# Patient Record
Sex: FEMALE | Race: WHITE | NOT HISPANIC OR LATINO
[De-identification: names, ages, dates, MRNs, and addresses within clinical notes are randomized per-mention and may not be internally consistent; named-entity substitution may affect disease eponyms.]

---

## 2018-05-24 ENCOUNTER — RESULT REVIEW (OUTPATIENT)
Age: 48
End: 2018-05-24

## 2018-08-17 ENCOUNTER — OUTPATIENT (OUTPATIENT)
Dept: OUTPATIENT SERVICES | Facility: HOSPITAL | Age: 48
LOS: 1 days | End: 2018-08-17
Payer: COMMERCIAL

## 2018-08-17 ENCOUNTER — APPOINTMENT (OUTPATIENT)
Dept: SURGERY | Facility: CLINIC | Age: 48
End: 2018-08-17

## 2018-08-17 VITALS
RESPIRATION RATE: 16 BRPM | SYSTOLIC BLOOD PRESSURE: 112 MMHG | WEIGHT: 200.62 LBS | HEART RATE: 88 BPM | OXYGEN SATURATION: 98 % | DIASTOLIC BLOOD PRESSURE: 55 MMHG | TEMPERATURE: 99 F | HEIGHT: 62.5 IN

## 2018-08-17 DIAGNOSIS — Z98.890 OTHER SPECIFIED POSTPROCEDURAL STATES: Chronic | ICD-10-CM

## 2018-08-17 DIAGNOSIS — Z01.818 ENCOUNTER FOR OTHER PREPROCEDURAL EXAMINATION: ICD-10-CM

## 2018-08-17 DIAGNOSIS — E66.01 MORBID (SEVERE) OBESITY DUE TO EXCESS CALORIES: ICD-10-CM

## 2018-08-17 DIAGNOSIS — Z41.9 ENCOUNTER FOR PROCEDURE FOR PURPOSES OTHER THAN REMEDYING HEALTH STATE, UNSPECIFIED: Chronic | ICD-10-CM

## 2018-08-17 LAB
ALBUMIN SERPL ELPH-MCNC: 4.8 G/DL — SIGNIFICANT CHANGE UP (ref 3.3–5)
ALP SERPL-CCNC: 72 U/L — SIGNIFICANT CHANGE UP (ref 40–120)
ALT FLD-CCNC: 41 U/L — SIGNIFICANT CHANGE UP (ref 10–45)
ANION GAP SERPL CALC-SCNC: 19 MMOL/L — HIGH (ref 5–17)
APPEARANCE UR: CLEAR — SIGNIFICANT CHANGE UP
APTT BLD: 32.1 SEC — SIGNIFICANT CHANGE UP (ref 27.5–37.4)
AST SERPL-CCNC: 36 U/L — SIGNIFICANT CHANGE UP (ref 10–40)
BACTERIA # UR AUTO: PRESENT /HPF
BILIRUB SERPL-MCNC: 0.4 MG/DL — SIGNIFICANT CHANGE UP (ref 0.2–1.2)
BILIRUB UR-MCNC: NEGATIVE — SIGNIFICANT CHANGE UP
BUN SERPL-MCNC: 18 MG/DL — SIGNIFICANT CHANGE UP (ref 7–23)
CALCIUM SERPL-MCNC: 10.2 MG/DL — SIGNIFICANT CHANGE UP (ref 8.4–10.5)
CHLORIDE SERPL-SCNC: 95 MMOL/L — LOW (ref 96–108)
CO2 SERPL-SCNC: 23 MMOL/L — SIGNIFICANT CHANGE UP (ref 22–31)
COLOR SPEC: YELLOW — SIGNIFICANT CHANGE UP
CREAT SERPL-MCNC: 0.73 MG/DL — SIGNIFICANT CHANGE UP (ref 0.5–1.3)
DIFF PNL FLD: ABNORMAL
EPI CELLS # UR: ABNORMAL /HPF (ref 0–5)
GLUCOSE SERPL-MCNC: 198 MG/DL — HIGH (ref 70–99)
GLUCOSE UR QL: NEGATIVE — SIGNIFICANT CHANGE UP
HCG SERPL-ACNC: <.1 MIU/ML — SIGNIFICANT CHANGE UP
HCT VFR BLD CALC: 43.9 % — SIGNIFICANT CHANGE UP (ref 34.5–45)
HGB BLD-MCNC: 13.8 G/DL — SIGNIFICANT CHANGE UP (ref 11.5–15.5)
INR BLD: 1.04 — SIGNIFICANT CHANGE UP (ref 0.88–1.16)
KETONES UR-MCNC: 15 MG/DL
LEUKOCYTE ESTERASE UR-ACNC: NEGATIVE — SIGNIFICANT CHANGE UP
MCHC RBC-ENTMCNC: 28.3 PG — SIGNIFICANT CHANGE UP (ref 27–34)
MCHC RBC-ENTMCNC: 31.4 G/DL — LOW (ref 32–36)
MCV RBC AUTO: 90.1 FL — SIGNIFICANT CHANGE UP (ref 80–100)
NITRITE UR-MCNC: NEGATIVE — SIGNIFICANT CHANGE UP
PH UR: 6 — SIGNIFICANT CHANGE UP (ref 5–8)
PLATELET # BLD AUTO: 307 K/UL — SIGNIFICANT CHANGE UP (ref 150–400)
POTASSIUM SERPL-MCNC: 4.8 MMOL/L — SIGNIFICANT CHANGE UP (ref 3.5–5.3)
POTASSIUM SERPL-SCNC: 4.8 MMOL/L — SIGNIFICANT CHANGE UP (ref 3.5–5.3)
PROT SERPL-MCNC: 8.6 G/DL — HIGH (ref 6–8.3)
PROT UR-MCNC: NEGATIVE MG/DL — SIGNIFICANT CHANGE UP
PROTHROM AB SERPL-ACNC: 11.5 SEC — SIGNIFICANT CHANGE UP (ref 9.8–12.7)
RBC # BLD: 4.87 M/UL — SIGNIFICANT CHANGE UP (ref 3.8–5.2)
RBC # FLD: 15.3 % — SIGNIFICANT CHANGE UP (ref 10.3–16.9)
RBC CASTS # UR COMP ASSIST: < 5 /HPF — SIGNIFICANT CHANGE UP
SODIUM SERPL-SCNC: 137 MMOL/L — SIGNIFICANT CHANGE UP (ref 135–145)
SP GR SPEC: 1.02 — SIGNIFICANT CHANGE UP (ref 1–1.03)
UROBILINOGEN FLD QL: 0.2 E.U./DL — SIGNIFICANT CHANGE UP
WBC # BLD: 7.7 K/UL — SIGNIFICANT CHANGE UP (ref 3.8–10.5)
WBC # FLD AUTO: 7.7 K/UL — SIGNIFICANT CHANGE UP (ref 3.8–10.5)
WBC UR QL: < 5 /HPF — SIGNIFICANT CHANGE UP

## 2018-08-17 PROCEDURE — 93010 ELECTROCARDIOGRAM REPORT: CPT

## 2018-08-17 PROCEDURE — 71046 X-RAY EXAM CHEST 2 VIEWS: CPT | Mod: 26

## 2018-08-17 NOTE — H&P PST ADULT - PSH
H/O hernia repair  abdominal hernia repair 2012  History of D&C  2 D&Cs before 2008, after miscarriages.  Surgery, elective  C Section x2  2008; 2011

## 2018-08-17 NOTE — PATIENT PROFILE ADULT. - PMH
Acid reflux    Anxiety    Arthritis    Asthma    Depression    Diabetes    Hiatal hernia    Hyperlipidemia    Hypertension    Sleep apnea    Urinary problem  over active bladder

## 2018-08-17 NOTE — H&P PST ADULT - FAMILY HISTORY
Father  Still living? Unknown  Type 2 diabetes mellitus, Age at diagnosis: Age Unknown  Hypertension, Age at diagnosis: Age Unknown     Mother  Still living? No  Type 2 diabetes mellitus, Age at diagnosis: Age Unknown  Family history of polycystic ovaries, Age at diagnosis: Age Unknown  Hypertension, Age at diagnosis: Age Unknown

## 2018-08-17 NOTE — H&P PST ADULT - HISTORY OF PRESENT ILLNESS
Patient decided in late winter of 2017 that she wishes to undergo laparoscopic sleeve gastrectomy after numerous failed attempts at dietary weight loss.

## 2018-08-24 VITALS
SYSTOLIC BLOOD PRESSURE: 152 MMHG | RESPIRATION RATE: 18 BRPM | HEART RATE: 79 BPM | OXYGEN SATURATION: 98 % | HEIGHT: 62.5 IN | DIASTOLIC BLOOD PRESSURE: 68 MMHG | WEIGHT: 191.8 LBS | TEMPERATURE: 98 F

## 2018-08-24 NOTE — PATIENT PROFILE ADULT. - PMH
Acid reflux    Anxiety    Arthritis    Asthma    Depression    Diabetes    Hiatal hernia    Hyperlipidemia    Hypertension    Sleep apnea    Urinary problem  over active bladder Acid reflux    Anxiety    Arthritis    Asthma    Depression    Diabetes    Hiatal hernia    Hyperlipidemia    Hypertension    Polycystic ovarian syndrome    Sleep apnea    Urinary problem  over active bladder

## 2018-08-24 NOTE — PRE-OP CHECKLIST - SELECT TESTS ORDERED
CBC/CMP/PT/PTT/INR/Urinalysis/HCG/EKG/CXR ICON-/CBC/CMP/PT/PTT/INR/Urinalysis/HCG/EKG/CXR ICON-negative/CBC/CMP/PT/PTT/INR/Urinalysis/HCG/EKG/CXR

## 2018-08-24 NOTE — PATIENT PROFILE ADULT. - PSH
H/O hernia repair  abdominal hernia repair 2012  Surgery, elective  C Section x2  2008; 2011 Elective surgery  D & C x2  H/O hernia repair  abdominal hernia repair 2012  History of D&C  2 D&Cs before 2008, after miscarriages.  Surgery, elective  C Section x2  2008; 2011

## 2018-08-27 ENCOUNTER — RESULT REVIEW (OUTPATIENT)
Age: 48
End: 2018-08-27

## 2018-08-27 ENCOUNTER — INPATIENT (INPATIENT)
Facility: HOSPITAL | Age: 48
LOS: 1 days | Discharge: HOME | DRG: 621 | End: 2018-08-29
Attending: SURGERY | Admitting: SURGERY
Payer: COMMERCIAL

## 2018-08-27 DIAGNOSIS — Z98.890 OTHER SPECIFIED POSTPROCEDURAL STATES: Chronic | ICD-10-CM

## 2018-08-27 DIAGNOSIS — Z41.9 ENCOUNTER FOR PROCEDURE FOR PURPOSES OTHER THAN REMEDYING HEALTH STATE, UNSPECIFIED: Chronic | ICD-10-CM

## 2018-08-27 LAB
BLD GP AB SCN SERPL QL: NEGATIVE — SIGNIFICANT CHANGE UP
GLUCOSE BLDC GLUCOMTR-MCNC: 126 MG/DL — HIGH (ref 70–99)
GLUCOSE BLDC GLUCOMTR-MCNC: 142 MG/DL — HIGH (ref 70–99)
GLUCOSE BLDC GLUCOMTR-MCNC: 172 MG/DL — HIGH (ref 70–99)
RH IG SCN BLD-IMP: POSITIVE — SIGNIFICANT CHANGE UP

## 2018-08-27 RX ORDER — BUPROPION HYDROCHLORIDE 150 MG/1
150 TABLET, EXTENDED RELEASE ORAL DAILY
Refills: 0 | Status: DISCONTINUED | OUTPATIENT
Start: 2018-08-27 | End: 2018-08-29

## 2018-08-27 RX ORDER — SODIUM CHLORIDE 9 MG/ML
1000 INJECTION, SOLUTION INTRAVENOUS
Refills: 0 | Status: DISCONTINUED | OUTPATIENT
Start: 2018-08-27 | End: 2018-08-29

## 2018-08-27 RX ORDER — MONTELUKAST 4 MG/1
10 TABLET, CHEWABLE ORAL DAILY
Refills: 0 | Status: DISCONTINUED | OUTPATIENT
Start: 2018-08-27 | End: 2018-08-29

## 2018-08-27 RX ORDER — DEXTROSE 50 % IN WATER 50 %
12.5 SYRINGE (ML) INTRAVENOUS ONCE
Refills: 0 | Status: DISCONTINUED | OUTPATIENT
Start: 2018-08-27 | End: 2018-08-29

## 2018-08-27 RX ORDER — INSULIN LISPRO 100/ML
VIAL (ML) SUBCUTANEOUS
Refills: 0 | Status: DISCONTINUED | OUTPATIENT
Start: 2018-08-27 | End: 2018-08-29

## 2018-08-27 RX ORDER — GLUCAGON INJECTION, SOLUTION 0.5 MG/.1ML
1 INJECTION, SOLUTION SUBCUTANEOUS ONCE
Refills: 0 | Status: DISCONTINUED | OUTPATIENT
Start: 2018-08-27 | End: 2018-08-29

## 2018-08-27 RX ORDER — IPRATROPIUM/ALBUTEROL SULFATE 18-103MCG
3 AEROSOL WITH ADAPTER (GRAM) INHALATION EVERY 6 HOURS
Refills: 0 | Status: DISCONTINUED | OUTPATIENT
Start: 2018-08-27 | End: 2018-08-29

## 2018-08-27 RX ORDER — HYDROMORPHONE HYDROCHLORIDE 2 MG/ML
0.5 INJECTION INTRAMUSCULAR; INTRAVENOUS; SUBCUTANEOUS EVERY 4 HOURS
Refills: 0 | Status: DISCONTINUED | OUTPATIENT
Start: 2018-08-27 | End: 2018-08-28

## 2018-08-27 RX ORDER — DEXTROSE 50 % IN WATER 50 %
25 SYRINGE (ML) INTRAVENOUS ONCE
Refills: 0 | Status: DISCONTINUED | OUTPATIENT
Start: 2018-08-27 | End: 2018-08-29

## 2018-08-27 RX ORDER — ACETAMINOPHEN 500 MG
1000 TABLET ORAL ONCE
Refills: 0 | Status: COMPLETED | OUTPATIENT
Start: 2018-08-27 | End: 2018-08-27

## 2018-08-27 RX ORDER — ENOXAPARIN SODIUM 100 MG/ML
40 INJECTION SUBCUTANEOUS ONCE
Refills: 0 | Status: COMPLETED | OUTPATIENT
Start: 2018-08-27 | End: 2018-08-27

## 2018-08-27 RX ORDER — PANTOPRAZOLE SODIUM 20 MG/1
40 TABLET, DELAYED RELEASE ORAL DAILY
Refills: 0 | Status: DISCONTINUED | OUTPATIENT
Start: 2018-08-27 | End: 2018-08-29

## 2018-08-27 RX ORDER — DEXTROSE 50 % IN WATER 50 %
15 SYRINGE (ML) INTRAVENOUS ONCE
Refills: 0 | Status: DISCONTINUED | OUTPATIENT
Start: 2018-08-27 | End: 2018-08-29

## 2018-08-27 RX ORDER — ESCITALOPRAM OXALATE 10 MG/1
20 TABLET, FILM COATED ORAL DAILY
Refills: 0 | Status: DISCONTINUED | OUTPATIENT
Start: 2018-08-27 | End: 2018-08-29

## 2018-08-27 RX ORDER — SCOPALAMINE 1 MG/3D
1 PATCH, EXTENDED RELEASE TRANSDERMAL ONCE
Refills: 0 | Status: COMPLETED | OUTPATIENT
Start: 2018-08-27 | End: 2018-08-27

## 2018-08-27 RX ORDER — HYDROMORPHONE HYDROCHLORIDE 2 MG/ML
1 INJECTION INTRAMUSCULAR; INTRAVENOUS; SUBCUTANEOUS EVERY 4 HOURS
Refills: 0 | Status: DISCONTINUED | OUTPATIENT
Start: 2018-08-27 | End: 2018-08-28

## 2018-08-27 RX ORDER — BUPIVACAINE 13.3 MG/ML
20 INJECTION, SUSPENSION, LIPOSOMAL INFILTRATION ONCE
Refills: 0 | Status: DISCONTINUED | OUTPATIENT
Start: 2018-08-27 | End: 2018-08-29

## 2018-08-27 RX ORDER — HEPARIN SODIUM 5000 [USP'U]/ML
5000 INJECTION INTRAVENOUS; SUBCUTANEOUS EVERY 8 HOURS
Refills: 0 | Status: DISCONTINUED | OUTPATIENT
Start: 2018-08-28 | End: 2018-08-29

## 2018-08-27 RX ORDER — ONDANSETRON 8 MG/1
4 TABLET, FILM COATED ORAL EVERY 6 HOURS
Refills: 0 | Status: DISCONTINUED | OUTPATIENT
Start: 2018-08-27 | End: 2018-08-29

## 2018-08-27 RX ADMIN — SODIUM CHLORIDE 120 MILLILITER(S): 9 INJECTION, SOLUTION INTRAVENOUS at 23:44

## 2018-08-27 RX ADMIN — HYDROMORPHONE HYDROCHLORIDE 0.5 MILLIGRAM(S): 2 INJECTION INTRAMUSCULAR; INTRAVENOUS; SUBCUTANEOUS at 20:50

## 2018-08-27 RX ADMIN — PANTOPRAZOLE SODIUM 40 MILLIGRAM(S): 20 TABLET, DELAYED RELEASE ORAL at 17:53

## 2018-08-27 RX ADMIN — HYDROMORPHONE HYDROCHLORIDE 0.5 MILLIGRAM(S): 2 INJECTION INTRAMUSCULAR; INTRAVENOUS; SUBCUTANEOUS at 20:55

## 2018-08-27 RX ADMIN — HEPARIN SODIUM 5000 UNIT(S): 5000 INJECTION INTRAVENOUS; SUBCUTANEOUS at 23:41

## 2018-08-27 RX ADMIN — Medication 1000 MILLIGRAM(S): at 23:06

## 2018-08-27 RX ADMIN — ONDANSETRON 4 MILLIGRAM(S): 8 TABLET, FILM COATED ORAL at 17:59

## 2018-08-27 RX ADMIN — Medication 50 MILLIGRAM(S): at 23:42

## 2018-08-27 RX ADMIN — Medication 3 MILLILITER(S): at 21:58

## 2018-08-27 RX ADMIN — SODIUM CHLORIDE 150 MILLILITER(S): 9 INJECTION, SOLUTION INTRAVENOUS at 18:31

## 2018-08-27 RX ADMIN — HYDROMORPHONE HYDROCHLORIDE 1 MILLIGRAM(S): 2 INJECTION INTRAMUSCULAR; INTRAVENOUS; SUBCUTANEOUS at 18:00

## 2018-08-27 RX ADMIN — ENOXAPARIN SODIUM 40 MILLIGRAM(S): 100 INJECTION SUBCUTANEOUS at 13:02

## 2018-08-27 RX ADMIN — HYDROMORPHONE HYDROCHLORIDE 1 MILLIGRAM(S): 2 INJECTION INTRAMUSCULAR; INTRAVENOUS; SUBCUTANEOUS at 18:31

## 2018-08-27 RX ADMIN — Medication: at 17:55

## 2018-08-27 RX ADMIN — Medication 400 MILLIGRAM(S): at 22:40

## 2018-08-27 RX ADMIN — SCOPALAMINE 1 PATCH: 1 PATCH, EXTENDED RELEASE TRANSDERMAL at 13:02

## 2018-08-27 RX ADMIN — ONDANSETRON 4 MILLIGRAM(S): 8 TABLET, FILM COATED ORAL at 23:44

## 2018-08-27 NOTE — H&P ADULT - PSH
Elective surgery  D & C x2  H/O hernia repair  abdominal hernia repair 2012  History of D&C  2 D&Cs before 2008, after miscarriages.  Surgery, elective  C Section x2  2008; 2011

## 2018-08-27 NOTE — BRIEF OPERATIVE NOTE - PROCEDURE
<<-----Click on this checkbox to enter Procedure Gastrectomy, laparoscopic sleeve  08/27/2018    Active  SSABRUDIN

## 2018-08-27 NOTE — H&P ADULT - NSHPPHYSICALEXAM_GEN_ALL_CORE
General : comfortable, not in acute distress  RS : Normal breath sounds  Abdomen : soft, ND, NT  Extremities : no edema, WWP

## 2018-08-27 NOTE — H&P ADULT - PMH
Acid reflux    Anxiety    Arthritis    Asthma    Depression    Diabetes    Hiatal hernia    Hyperlipidemia    Hypertension    Polycystic ovarian syndrome    Sleep apnea    Urinary problem  over active bladder

## 2018-08-27 NOTE — PROGRESS NOTE ADULT - SUBJECTIVE AND OBJECTIVE BOX
POST-OPERATIVE NOTE    Procedure: Laparoscopic sleeve gastrectomy    Diagnosis/Indication: morbid obesity    Surgeon: Dr. Agulia    S: Pt has no complaints. Denies CP, SOB, ORTIZ, calf tenderness. Pain controlled with medication. Denies nausea, vomiting.    O:  T(C): 36.1 (08-27-18 @ 20:28), Max: 36.7 (08-27-18 @ 17:21)  T(F): 97 (08-27-18 @ 20:28), Max: 98 (08-27-18 @ 17:21)  HR: 92 (08-27-18 @ 20:00) (84 - 106)  BP: 171/79 (08-27-18 @ 20:00) (152/72 - 171/79)  RR: 18 (08-27-18 @ 20:00) (10 - 18)  SpO2: 98% (08-27-18 @ 20:00) (93% - 98%)  Wt(kg): --    Gen: NAD, resting comfortably in bed  C/V: NSR  Pulm: Nonlabored breathing, no respiratory distress  Abd: soft, NT/ND, incision areas are clean, dry and intact  Extrem: WWP, no calf edema or tenderness, SCDs in place    A/P: 47y Female s/p above procedure  Diet: BCLD  IVF: LR @ 150  Pain/nausea control  SQH/SCDs/OOBA/IS  Dispo pending pain control, PO tolerance, clinical improvement POST-OPERATIVE NOTE    Procedure: Laparoscopic sleeve gastrectomy    Diagnosis/Indication: morbid obesity    Surgeon: Dr. Aguila    S: Pt has no complaints. Denies CP, SOB, ORTIZ, calf tenderness. Pain controlled with medication. Denies nausea, vomiting.    O:  T(C): 36.1 (08-27-18 @ 20:28), Max: 36.7 (08-27-18 @ 17:21)  T(F): 97 (08-27-18 @ 20:28), Max: 98 (08-27-18 @ 17:21)  HR: 92 (08-27-18 @ 20:00) (84 - 106)  BP: 171/79 (08-27-18 @ 20:00) (152/72 - 171/79)  RR: 18 (08-27-18 @ 20:00) (10 - 18)  SpO2: 98% (08-27-18 @ 20:00) (93% - 98%)  Wt(kg): --    Gen: NAD, resting comfortably in bed  C/V: NSR  Pulm: Nonlabored breathing, no respiratory distress  Abd: soft, NT/ND, incision areas are clean, dry and intact  Extrem: WWP, no calf edema or tenderness, SCDs in place    A/P: 47y Female s/p above procedure  Diet: BCLD  IVF: LR @ 150  Pain/nausea control  SQH/SCDs/OOBA/IS  Dispo pending pain control, PO tolerance, clinical improvement

## 2018-08-27 NOTE — BRIEF OPERATIVE NOTE - OPERATION/FINDINGS
Pneumoperitoneum created via Downing technique. Hiatal hernia identified and repaired. Sleeve gastrectomy was done by using 38F bougie. Hemostasis achieved. No active bleeding seen at the end of the procedure.

## 2018-08-27 NOTE — H&P ADULT - ASSESSMENT
47yo F with multiple co-morbidities and morbid obesity, for elective laparoscopic sleeve gastrectomy.    Plan :  Admit under   Scopolamine patch  IV Lovenox  NPO/IVF  Type and cross   For lap sleeve today 49yo F with multiple co-morbidities and morbid obesity, for elective laparoscopic sleeve gastrectomy.    Plan :  Admit under   Scopolamine patch  IV Lovenox  NPO/IVF  Type and cross   For lap sleeve today

## 2018-08-27 NOTE — PROGRESS NOTE ADULT - SUBJECTIVE AND OBJECTIVE BOX
POST-OPERATIVE NOTE    Procedure: Laparoscopic sleeve gastrectomy with hiatal hernia repair    Diagnosis/Indication: morbid obesity    Surgeon: Dr. Aguila    S: Pt has no complaints. Denies CP, SOB, ORTIZ, calf tenderness. Pain controlled with medication. Denies nausea, vomiting.    O:  T(C): 36.1 (08-27-18 @ 20:28), Max: 36.1 (08-27-18 @ 20:28)  T(F): 97 (08-27-18 @ 20:28), Max: 97 (08-27-18 @ 20:28)  HR: 78 (08-27-18 @ 21:16) (78 - 106)  BP: 168/76 (08-27-18 @ 21:16) (158/75 - 171/79)  RR: 16 (08-27-18 @ 21:16) (10 - 18)  SpO2: 95% (08-27-18 @ 21:16) (95% - 98%)  Wt(kg): --    Gen: NAD, resting comfortably in bed  C/V: NSR  Pulm: Nonlabored breathing, no respiratory distress  Abd: soft, NT/ND, incision areas are clean, dry and intact  Extrem: WWP, no calf edema or tenderness, SCDs in place    A/P: 47y Female s/p above procedure  Diet: BCLD  IVF: LR @150  Pain/nausea control  SQH/SCDs/OOBA/IS  Dispo pending pain control, PO tolerance, clinical improvement

## 2018-08-27 NOTE — H&P ADULT - HISTORY OF PRESENT ILLNESS
48yo F with PCOS, REGINO, GERD. HTN. DM. Asthma, HLD, Anxiety, depression, arthritis, hiatal hernia, h/o hernia repair, morbid obesity (BMI 34.5), for elective laparoscopic sleeve gastrectomy. 46yo F with PCOS, REGINO, GERD. HTN. DM. Asthma, HLD, Anxiety, depression, arthritis, hiatal hernia, h/o hernia repair, morbid obesity (BMI 34.5), for elective laparoscopic sleeve gastrectomy.

## 2018-08-28 ENCOUNTER — TRANSCRIPTION ENCOUNTER (OUTPATIENT)
Age: 48
End: 2018-08-28

## 2018-08-28 DIAGNOSIS — G47.33 OBSTRUCTIVE SLEEP APNEA (ADULT) (PEDIATRIC): ICD-10-CM

## 2018-08-28 DIAGNOSIS — F41.1 GENERALIZED ANXIETY DISORDER: ICD-10-CM

## 2018-08-28 DIAGNOSIS — E11.9 TYPE 2 DIABETES MELLITUS WITHOUT COMPLICATIONS: ICD-10-CM

## 2018-08-28 DIAGNOSIS — E28.2 POLYCYSTIC OVARIAN SYNDROME: ICD-10-CM

## 2018-08-28 DIAGNOSIS — I10 ESSENTIAL (PRIMARY) HYPERTENSION: ICD-10-CM

## 2018-08-28 DIAGNOSIS — N32.81 OVERACTIVE BLADDER: ICD-10-CM

## 2018-08-28 DIAGNOSIS — E66.01 MORBID (SEVERE) OBESITY DUE TO EXCESS CALORIES: ICD-10-CM

## 2018-08-28 LAB
ANION GAP SERPL CALC-SCNC: 18 MMOL/L — HIGH (ref 5–17)
BUN SERPL-MCNC: 8 MG/DL — SIGNIFICANT CHANGE UP (ref 7–23)
CALCIUM SERPL-MCNC: 9.5 MG/DL — SIGNIFICANT CHANGE UP (ref 8.4–10.5)
CHLORIDE SERPL-SCNC: 99 MMOL/L — SIGNIFICANT CHANGE UP (ref 96–108)
CO2 SERPL-SCNC: 21 MMOL/L — LOW (ref 22–31)
CREAT SERPL-MCNC: 0.65 MG/DL — SIGNIFICANT CHANGE UP (ref 0.5–1.3)
GLUCOSE BLDC GLUCOMTR-MCNC: 117 MG/DL — HIGH (ref 70–99)
GLUCOSE BLDC GLUCOMTR-MCNC: 137 MG/DL — HIGH (ref 70–99)
GLUCOSE BLDC GLUCOMTR-MCNC: 152 MG/DL — HIGH (ref 70–99)
GLUCOSE BLDC GLUCOMTR-MCNC: 163 MG/DL — HIGH (ref 70–99)
GLUCOSE SERPL-MCNC: 162 MG/DL — HIGH (ref 70–99)
HBA1C BLD-MCNC: 7.8 % — HIGH (ref 4–5.6)
HCT VFR BLD CALC: 37.2 % — SIGNIFICANT CHANGE UP (ref 34.5–45)
HCT VFR BLD CALC: 38.1 % — SIGNIFICANT CHANGE UP (ref 34.5–45)
HGB BLD-MCNC: 11.9 G/DL — SIGNIFICANT CHANGE UP (ref 11.5–15.5)
HGB BLD-MCNC: 12.3 G/DL — SIGNIFICANT CHANGE UP (ref 11.5–15.5)
MAGNESIUM SERPL-MCNC: 1.7 MG/DL — SIGNIFICANT CHANGE UP (ref 1.6–2.6)
MCHC RBC-ENTMCNC: 28.6 PG — SIGNIFICANT CHANGE UP (ref 27–34)
MCHC RBC-ENTMCNC: 28.7 PG — SIGNIFICANT CHANGE UP (ref 27–34)
MCHC RBC-ENTMCNC: 32 G/DL — SIGNIFICANT CHANGE UP (ref 32–36)
MCHC RBC-ENTMCNC: 32.3 G/DL — SIGNIFICANT CHANGE UP (ref 32–36)
MCV RBC AUTO: 88.8 FL — SIGNIFICANT CHANGE UP (ref 80–100)
MCV RBC AUTO: 89.4 FL — SIGNIFICANT CHANGE UP (ref 80–100)
PHOSPHATE SERPL-MCNC: 4.8 MG/DL — HIGH (ref 2.5–4.5)
PLATELET # BLD AUTO: 264 K/UL — SIGNIFICANT CHANGE UP (ref 150–400)
PLATELET # BLD AUTO: 281 K/UL — SIGNIFICANT CHANGE UP (ref 150–400)
POTASSIUM SERPL-MCNC: 4.5 MMOL/L — SIGNIFICANT CHANGE UP (ref 3.5–5.3)
POTASSIUM SERPL-SCNC: 4.5 MMOL/L — SIGNIFICANT CHANGE UP (ref 3.5–5.3)
RBC # BLD: 4.16 M/UL — SIGNIFICANT CHANGE UP (ref 3.8–5.2)
RBC # BLD: 4.29 M/UL — SIGNIFICANT CHANGE UP (ref 3.8–5.2)
RBC # FLD: 15.3 % — SIGNIFICANT CHANGE UP (ref 10.3–16.9)
SODIUM SERPL-SCNC: 138 MMOL/L — SIGNIFICANT CHANGE UP (ref 135–145)
WBC # BLD: 10.6 K/UL — HIGH (ref 3.8–10.5)
WBC # BLD: 7.8 K/UL — SIGNIFICANT CHANGE UP (ref 3.8–10.5)
WBC # FLD AUTO: 10.6 K/UL — HIGH (ref 3.8–10.5)
WBC # FLD AUTO: 7.8 K/UL — SIGNIFICANT CHANGE UP (ref 3.8–10.5)

## 2018-08-28 RX ORDER — ACETAMINOPHEN 500 MG
1000 TABLET ORAL ONCE
Refills: 0 | Status: COMPLETED | OUTPATIENT
Start: 2018-08-28 | End: 2018-08-28

## 2018-08-28 RX ORDER — HYOSCYAMINE SULFATE 0.13 MG
0.12 TABLET ORAL EVERY 4 HOURS
Refills: 0 | Status: DISCONTINUED | OUTPATIENT
Start: 2018-08-28 | End: 2018-08-29

## 2018-08-28 RX ORDER — HYDROMORPHONE HYDROCHLORIDE 2 MG/ML
0.5 INJECTION INTRAMUSCULAR; INTRAVENOUS; SUBCUTANEOUS ONCE
Refills: 0 | Status: DISCONTINUED | OUTPATIENT
Start: 2018-08-28 | End: 2018-08-28

## 2018-08-28 RX ORDER — HYDRALAZINE HCL 50 MG
5 TABLET ORAL ONCE
Refills: 0 | Status: COMPLETED | OUTPATIENT
Start: 2018-08-28 | End: 2018-08-28

## 2018-08-28 RX ORDER — MAGNESIUM SULFATE 500 MG/ML
2 VIAL (ML) INJECTION ONCE
Refills: 0 | Status: COMPLETED | OUTPATIENT
Start: 2018-08-28 | End: 2018-08-28

## 2018-08-28 RX ORDER — OXYCODONE AND ACETAMINOPHEN 5; 325 MG/1; MG/1
2 TABLET ORAL EVERY 4 HOURS
Refills: 0 | Status: DISCONTINUED | OUTPATIENT
Start: 2018-08-28 | End: 2018-08-29

## 2018-08-28 RX ORDER — SIMETHICONE 80 MG/1
80 TABLET, CHEWABLE ORAL EVERY 6 HOURS
Refills: 0 | Status: DISCONTINUED | OUTPATIENT
Start: 2018-08-28 | End: 2018-08-29

## 2018-08-28 RX ORDER — SIMETHICONE 80 MG/1
80 TABLET, CHEWABLE ORAL ONCE
Refills: 0 | Status: COMPLETED | OUTPATIENT
Start: 2018-08-28 | End: 2018-08-28

## 2018-08-28 RX ORDER — OXYCODONE AND ACETAMINOPHEN 5; 325 MG/1; MG/1
1 TABLET ORAL EVERY 4 HOURS
Refills: 0 | Status: DISCONTINUED | OUTPATIENT
Start: 2018-08-28 | End: 2018-08-29

## 2018-08-28 RX ADMIN — PANTOPRAZOLE SODIUM 40 MILLIGRAM(S): 20 TABLET, DELAYED RELEASE ORAL at 11:00

## 2018-08-28 RX ADMIN — HYDROMORPHONE HYDROCHLORIDE 0.5 MILLIGRAM(S): 2 INJECTION INTRAMUSCULAR; INTRAVENOUS; SUBCUTANEOUS at 16:21

## 2018-08-28 RX ADMIN — Medication 0.12 MILLIGRAM(S): at 22:05

## 2018-08-28 RX ADMIN — Medication 50 MILLIGRAM(S): at 14:35

## 2018-08-28 RX ADMIN — HYDROMORPHONE HYDROCHLORIDE 0.5 MILLIGRAM(S): 2 INJECTION INTRAMUSCULAR; INTRAVENOUS; SUBCUTANEOUS at 05:43

## 2018-08-28 RX ADMIN — HEPARIN SODIUM 5000 UNIT(S): 5000 INJECTION INTRAVENOUS; SUBCUTANEOUS at 14:35

## 2018-08-28 RX ADMIN — MONTELUKAST 10 MILLIGRAM(S): 4 TABLET, CHEWABLE ORAL at 11:00

## 2018-08-28 RX ADMIN — Medication 2: at 06:34

## 2018-08-28 RX ADMIN — ESCITALOPRAM OXALATE 20 MILLIGRAM(S): 10 TABLET, FILM COATED ORAL at 11:00

## 2018-08-28 RX ADMIN — HYDROMORPHONE HYDROCHLORIDE 1 MILLIGRAM(S): 2 INJECTION INTRAMUSCULAR; INTRAVENOUS; SUBCUTANEOUS at 07:33

## 2018-08-28 RX ADMIN — SODIUM CHLORIDE 75 MILLILITER(S): 9 INJECTION, SOLUTION INTRAVENOUS at 11:01

## 2018-08-28 RX ADMIN — Medication 3 MILLILITER(S): at 10:50

## 2018-08-28 RX ADMIN — HYDROMORPHONE HYDROCHLORIDE 1 MILLIGRAM(S): 2 INJECTION INTRAMUSCULAR; INTRAVENOUS; SUBCUTANEOUS at 02:44

## 2018-08-28 RX ADMIN — SIMETHICONE 80 MILLIGRAM(S): 80 TABLET, CHEWABLE ORAL at 18:34

## 2018-08-28 RX ADMIN — SIMETHICONE 80 MILLIGRAM(S): 80 TABLET, CHEWABLE ORAL at 07:47

## 2018-08-28 RX ADMIN — HYDROMORPHONE HYDROCHLORIDE 0.5 MILLIGRAM(S): 2 INJECTION INTRAMUSCULAR; INTRAVENOUS; SUBCUTANEOUS at 01:52

## 2018-08-28 RX ADMIN — OXYCODONE AND ACETAMINOPHEN 2 TABLET(S): 5; 325 TABLET ORAL at 20:31

## 2018-08-28 RX ADMIN — HYDROMORPHONE HYDROCHLORIDE 1 MILLIGRAM(S): 2 INJECTION INTRAMUSCULAR; INTRAVENOUS; SUBCUTANEOUS at 07:36

## 2018-08-28 RX ADMIN — Medication 3 MILLILITER(S): at 05:41

## 2018-08-28 RX ADMIN — OXYCODONE AND ACETAMINOPHEN 2 TABLET(S): 5; 325 TABLET ORAL at 12:11

## 2018-08-28 RX ADMIN — HEPARIN SODIUM 5000 UNIT(S): 5000 INJECTION INTRAVENOUS; SUBCUTANEOUS at 06:35

## 2018-08-28 RX ADMIN — HYDROMORPHONE HYDROCHLORIDE 1 MILLIGRAM(S): 2 INJECTION INTRAMUSCULAR; INTRAVENOUS; SUBCUTANEOUS at 02:47

## 2018-08-28 RX ADMIN — HYDROMORPHONE HYDROCHLORIDE 0.5 MILLIGRAM(S): 2 INJECTION INTRAMUSCULAR; INTRAVENOUS; SUBCUTANEOUS at 18:33

## 2018-08-28 RX ADMIN — HYDROMORPHONE HYDROCHLORIDE 0.5 MILLIGRAM(S): 2 INJECTION INTRAMUSCULAR; INTRAVENOUS; SUBCUTANEOUS at 17:00

## 2018-08-28 RX ADMIN — Medication 400 MILLIGRAM(S): at 07:47

## 2018-08-28 RX ADMIN — Medication 0.12 MILLIGRAM(S): at 17:47

## 2018-08-28 RX ADMIN — ONDANSETRON 4 MILLIGRAM(S): 8 TABLET, FILM COATED ORAL at 23:59

## 2018-08-28 RX ADMIN — HEPARIN SODIUM 5000 UNIT(S): 5000 INJECTION INTRAVENOUS; SUBCUTANEOUS at 22:05

## 2018-08-28 RX ADMIN — BUPROPION HYDROCHLORIDE 150 MILLIGRAM(S): 150 TABLET, EXTENDED RELEASE ORAL at 11:00

## 2018-08-28 RX ADMIN — SIMETHICONE 80 MILLIGRAM(S): 80 TABLET, CHEWABLE ORAL at 01:56

## 2018-08-28 RX ADMIN — ONDANSETRON 4 MILLIGRAM(S): 8 TABLET, FILM COATED ORAL at 17:29

## 2018-08-28 RX ADMIN — Medication 50 MILLIGRAM(S): at 06:34

## 2018-08-28 RX ADMIN — Medication 5 MILLIGRAM(S): at 17:29

## 2018-08-28 RX ADMIN — Medication 1000 MILLIGRAM(S): at 07:48

## 2018-08-28 RX ADMIN — OXYCODONE AND ACETAMINOPHEN 2 TABLET(S): 5; 325 TABLET ORAL at 11:11

## 2018-08-28 RX ADMIN — ONDANSETRON 4 MILLIGRAM(S): 8 TABLET, FILM COATED ORAL at 05:43

## 2018-08-28 RX ADMIN — HYDROMORPHONE HYDROCHLORIDE 0.5 MILLIGRAM(S): 2 INJECTION INTRAMUSCULAR; INTRAVENOUS; SUBCUTANEOUS at 19:00

## 2018-08-28 RX ADMIN — HYDROMORPHONE HYDROCHLORIDE 0.5 MILLIGRAM(S): 2 INJECTION INTRAMUSCULAR; INTRAVENOUS; SUBCUTANEOUS at 01:46

## 2018-08-28 RX ADMIN — Medication 50 GRAM(S): at 10:51

## 2018-08-28 RX ADMIN — HYDROMORPHONE HYDROCHLORIDE 0.5 MILLIGRAM(S): 2 INJECTION INTRAMUSCULAR; INTRAVENOUS; SUBCUTANEOUS at 05:50

## 2018-08-28 RX ADMIN — Medication 2: at 23:41

## 2018-08-28 RX ADMIN — ONDANSETRON 4 MILLIGRAM(S): 8 TABLET, FILM COATED ORAL at 11:00

## 2018-08-28 RX ADMIN — OXYCODONE AND ACETAMINOPHEN 2 TABLET(S): 5; 325 TABLET ORAL at 21:02

## 2018-08-28 RX ADMIN — Medication 3 MILLILITER(S): at 17:21

## 2018-08-28 NOTE — DISCHARGE NOTE ADULT - PATIENT PORTAL LINK FT
You can access the [facility] Patient Portal, offered by [facility], by registering with the following website: [email or web address] You can access the [facility] Patient Portal, offered by [facility], by registering with the following website: [email or web address] You can access the [facility] Patient Portal, offered by [facility], by registering with the following website: [email or web address]

## 2018-08-28 NOTE — DISCHARGE NOTE ADULT - HOSPITAL COURSE
46yo F with PCOS, REGINO, GERD. HTN. DM. Asthma, HLD, Anxiety, depression, arthritis, hiatal hernia, h/o hernia repair, morbid obesity (BMI 34.5), for elective laparoscopic sleeve gastrectomy. Procedure was uncomplicated and the patient tolerated well. His postoperative course was unremarkable with advancement of diet, passing trial of void, and pain control. On day of discharge patient was tolerating diet, voiding, ambulating, pain well controlled and stable to be discharge. 48yo F with PCOS, REGINO, GERD. HTN. DM. Asthma, HLD, Anxiety, depression, arthritis, hiatal hernia, h/o hernia repair, morbid obesity (BMI 34.5), for elective laparoscopic sleeve gastrectomy. Procedure was uncomplicated and the patient tolerated well. His postoperative course was unremarkable with advancement of diet, passing trial of void, and pain control. On day of discharge patient was tolerating diet, voiding, ambulating, pain well controlled and stable to be discharge.

## 2018-08-28 NOTE — DISCHARGE NOTE ADULT - NS MD DC FALL RISK RISK
For information on Fall & Injury Prevention, visit [email or web address] For information on Fall & Injury Prevention, visit [email or web address] For information on Fall & Injury Prevention, visit [email or web address]

## 2018-08-28 NOTE — PROGRESS NOTE ADULT - ASSESSMENT
48yo F with PCOS, REGINO, GERD. HTN. DM. Asthma, HLD, Anxiety, depression, arthritis, hiatal hernia, h/o hernia repair, morbid obesity (BMI 34.5), for elective laparoscopic sleeve gastrectomy.     Pain control/nausea control  BCLD/IVF  ISS for diabetes  CPAP  Duoneb for asthma and Montelukast  SCDs and restart HSQ depending on PM CBC

## 2018-08-28 NOTE — DISCHARGE NOTE ADULT - CARE PROVIDER_API CALL
Maria D Aguila), Surgery; Surgical Critical Care  [address] [facility] [address], [place] [address]  Phone: [phone]  Fax: [phone] Maria D Aguila), Surgery; Surgical Critical Care  [address] [facility] [address] [address]  Phone: [phone]  Fax: [phone] Maria D Aguila), Surgery; Surgical Critical Care  [address] [facility] [address] [address]  Phone: [phone]  Fax: [phone]

## 2018-08-28 NOTE — DIETITIAN INITIAL EVALUATION ADULT. - OTHER INFO
48yo F with PCOS, REGINO, GERD. HTN. DM. Asthma, HLD, Anxiety, depression, arthritis, hiatal hernia, h/o hernia repair, morbid obesity (BMI 34.5), s/p elective laparoscopic sleeve gastrectomy. Pt seen resting in bed. She reports tolerance to liquids, but reports only having sips of water so far. Denies N/V/D/C and mechanical issues. Pain being controlled. Pt c/o gas. PTA pt reports following with OP RD for wt loss and pre-op counseling. Provided extensive nutrition education s/p bariatric surgery regarding diet progression, hydration and vitamin supplementation. Pt endorses having premier shakes and appropriate vitamins at home. Continue to advance diet phases per medical team as appropriate/tolerated. 46yo F with PCOS, REGINO, GERD. HTN. DM. Asthma, HLD, Anxiety, depression, arthritis, hiatal hernia, h/o hernia repair, morbid obesity (BMI 34.5), s/p elective laparoscopic sleeve gastrectomy. Pt seen resting in bed. She reports tolerance to liquids, but reports only having sips of water so far. Denies N/V/D/C and mechanical issues. Pain being controlled. Pt c/o gas. PTA pt reports following with OP RD for wt loss and pre-op counseling. Provided extensive nutrition education s/p bariatric surgery regarding diet progression, hydration and vitamin supplementation. Pt endorses having premier shakes and appropriate vitamins at home. Continue to advance diet phases per medical team as appropriate/tolerated.

## 2018-08-28 NOTE — DISCHARGE NOTE ADULT - MEDICATION SUMMARY - MEDICATIONS TO TAKE
I will START or STAY ON the medications listed below when I get home from the hospital:    aspirin 81 mg oral tablet  -- 1 tab(s) by mouth once a day   -- Take with food or milk.    -- Indication: For Anticoagulation    Percocet 5/325 oral tablet  -- 1 tab(s) by mouth every 6 hours, As Needed -for severe pain MDD:4 tabs daily   -- Caution federal law prohibits the transfer of this drug to any person other  than the person for whom it was prescribed.  May cause drowsiness.  Alcohol may intensify this effect.  Use care when operating dangerous machinery.  This prescription cannot be refilled.  This product contains acetaminophen.  Do not use  with any other product containing acetaminophen to prevent possible liver damage.  Using more of this medication than prescribed may cause serious breathing problems.    -- Indication: For Severe pain    Lyrica 50 mg oral capsule  -- 1 tablet by mouth 3 times daily  -- Indication: For Anticonvulsant    escitalopram 20 mg oral tablet  -- 1 tab(s) by mouth once a day  -- Indication: For Antidepressant    metFORMIN 500 mg oral tablet  -- 2 tablets by mouth twice daily  -- Indication: For Diabetes    Zofran 4 mg oral tablet  -- 1 tab(s) by mouth once a day, As Needed -for nausea  -for nausea   -- Indication: For Nausea    cetirizine 10 mg oral tablet  -- 1 tab(s) by mouth once a day  -- Indication: For Antihistamine    Lipitor 40 mg oral tablet  -- 1 tab(s) by mouth once a day  -- Indication: For hyperlipidemia    hyoscyamine 0.125 mg oral tablet  -- 1 tab(s) by mouth once a day   -- May cause drowsiness.  Alcohol may intensify this effect.  Use care when operating dangerous machinery.    -- Indication: For Bowel Regimen    Colace 100 mg oral capsule  -- 1 cap(s) by mouth 2 times a day   -- Medication should be taken with plenty of water.    -- Indication: For Bowel Regimen    montelukast 10 mg oral tablet  -- 1 tab(s) by mouth once a day  -- Indication: For Asthma    Protonix 40 mg oral delayed release tablet  -- 1 tab(s) by mouth once a day   -- It is very important that you take or use this exactly as directed.  Do not skip doses or discontinue unless directed by your doctor.  Obtain medical advice before taking any non-prescription drugs as some may affect the action of this medication.  Swallow whole.  Do not crush.    -- Indication: For Gastric Acid supression    Dexilant 60 mg oral delayed release capsule  -- 1 cap(s) by mouth once a day  -- Indication: For Gastric Acid Suppression    buPROPion 150 mg/12 hours (SR) oral tablet, extended release  -- 1 tablet by mouth daily  -- Indication: For Antidepressant    multivitamin  -- 1 tablet by mouth once daily  -- Indication: For Vitamin    thiamine 100 mg oral tablet  -- 1 tab(s) by mouth once a day (just started pre surgery)  -- Indication: For Vitamin    Vitamin C 500 mg oral tablet  -- 1 tab(s) by mouth once a day  -- Indication: For Vitamin    Vitamin D3  -- 1 tablet by mouth once daily  -- Indication: For Vitamin

## 2018-08-28 NOTE — PROGRESS NOTE ADULT - SUBJECTIVE AND OBJECTIVE BOX
INTERVAL HPI/OVERNIGHT EVENTS: pain well controlled, passed TOV    STATUS POST:  lap sleeve gastrectomy, hiatal hernia repair    POST OPERATIVE DAY #: 1    SUBJECTIVE:  pt seen at bedside, complains of some gas and upper abdominal pain. denies nausea and vomiting    MEDICATIONS  (STANDING):  acetaminophen  IVPB. 1000 milliGRAM(s) IV Intermittent once  ALBUTerol/ipratropium for Nebulization 3 milliLiter(s) Nebulizer every 6 hours  BUpivacaine liposome 1.3% Injectable (no eMAR) 20 milliLiter(s) Local Injection once  buPROPion XL . 150 milliGRAM(s) Oral daily  dextrose 5%. 1000 milliLiter(s) (50 mL/Hr) IV Continuous <Continuous>  dextrose 50% Injectable 12.5 Gram(s) IV Push once  dextrose 50% Injectable 25 Gram(s) IV Push once  dextrose 50% Injectable 25 Gram(s) IV Push once  escitalopram 20 milliGRAM(s) Oral daily  heparin  Injectable 5000 Unit(s) SubCutaneous every 8 hours  insulin lispro (HumaLOG) corrective regimen sliding scale   SubCutaneous Before meals and at bedtime  lactated ringers. 1000 milliLiter(s) (120 mL/Hr) IV Continuous <Continuous>  montelukast 10 milliGRAM(s) Oral daily  ondansetron Injectable 4 milliGRAM(s) IV Push every 6 hours  pantoprazole  Injectable 40 milliGRAM(s) IV Push daily  pregabalin 50 milliGRAM(s) Oral three times a day    MEDICATIONS  (PRN):  dextrose 40% Gel 15 Gram(s) Oral once PRN Blood Glucose LESS THAN 70 milliGRAM(s)/deciliter  glucagon  Injectable 1 milliGRAM(s) IntraMuscular once PRN Glucose LESS THAN 70 milligrams/deciliter  HYDROmorphone  Injectable 0.5 milliGRAM(s) IV Push every 4 hours PRN Moderate Pain (4 - 6)  HYDROmorphone  Injectable 1 milliGRAM(s) IV Push every 4 hours PRN Severe Pain (7 - 10)  simethicone 80 milliGRAM(s) Chew every 6 hours PRN Gas      Vital Signs Last 24 Hrs  T(C): 36.5 (28 Aug 2018 05:35), Max: 37 (28 Aug 2018 02:08)  T(F): 97.7 (28 Aug 2018 05:35), Max: 98.6 (28 Aug 2018 02:08)  HR: 80 (28 Aug 2018 05:46) (70 - 106)  BP: 171/78 (28 Aug 2018 05:46) (152/72 - 189/81)  BP(mean): 105 (28 Aug 2018 02:59) (95 - 146)  RR: 16 (28 Aug 2018 05:46) (10 - 18)  SpO2: 98% (28 Aug 2018 05:46) (93% - 98%)    PHYSICAL EXAM:      Constitutional: A&Ox3    Respiratory: non labored breathing, no respiratory distress    Cardiovascular: NSR, RRR    Gastrointestinal: soft, nondistended, mild incisional tenderness, incisions c/d/i    Extremities: (-) edema                  I&O's Detail    27 Aug 2018 07:01  -  28 Aug 2018 07:00  --------------------------------------------------------  IN:    IV PiggyBack: 100 mL    lactated ringers.: 1695 mL  Total IN: 1795 mL    OUT:    Voided: 1800 mL  Total OUT: 1800 mL    Total NET: -5 mL          LABS:                        11.9   7.8   )-----------( 281      ( 28 Aug 2018 05:36 )             37.2     08-28    138  |  99  |  8   ----------------------------<  162<H>  4.5   |  21<L>  |  0.65    Ca    9.5      28 Aug 2018 05:37  Phos  4.8     08-28  Mg     1.7     08-28            RADIOLOGY & ADDITIONAL STUDIES:

## 2018-08-28 NOTE — DISCHARGE NOTE ADULT - PLAN OF CARE
Recovery May shower, NO baths or swimming. Keep incision sites clean & dry.  Do not remove Steri- strips; they will fall off after a few showers.   No heavy lifting  >20 pounds or strenuous exercise.   Diet: Bariatric Full Fluids. 60 grams protein daily.  64 fluid ounces water daily. Drink small sips throughout the day not to fall behind.  Call MD if increase in abdominal pain, nausea, vomiting, fever (temperature <101.4F), or chills  Follow up in 2 weeks in Dr. Aguila's office. Call Dr. Aguila to schedule appointment.  NO ASPRIN OR NSAIDs till approved by Dr. Aguila    Resume home medications. If lightheaded/dizzy hold medications. Follow up with your PCP/internist in 3-4 weeks to review medications.  Follow up with your PCP/internist.

## 2018-08-28 NOTE — DIETITIAN INITIAL EVALUATION ADULT. - ENERGY NEEDS
Ht 158.75cm; Wt 87Kg  IBW 51.1Kg; %%  BMI 34.5  Utilized IBW to calculate needs 2/2 LSG. 10-12kcal/kg (511-613kcal), 1.5-2.1g/kg (76-107g), >/=1900ml.

## 2018-08-28 NOTE — DIETITIAN INITIAL EVALUATION ADULT. - NS AS NUTRI INTERV ED CONTENT
Provided extensive nutrition education s/p bariatric surgery regarding diet progression, hydration and vitamin supplementation./Other (specify)

## 2018-08-28 NOTE — DISCHARGE NOTE ADULT - CARE PLAN
Principal Discharge DX:	Morbid (severe) obesity due to excess calories  Goal:	Recovery  Assessment and plan of treatment:	May shower, NO baths or swimming. Keep incision sites clean & dry.  Do not remove Steri- strips; they will fall off after a few showers.   No heavy lifting  >20 pounds or strenuous exercise.   Diet: Bariatric Full Fluids. 60 grams protein daily.  64 fluid ounces water daily. Drink small sips throughout the day not to fall behind.  Call MD if increase in abdominal pain, nausea, vomiting, fever (temperature <101.4F), or chills  Follow up in 2 weeks in Dr. Aguila's office. Call Dr. Aguila to schedule appointment.  NO ASPRIN OR NSAIDs till approved by Dr. Aguila    Resume home medications. If lightheaded/dizzy hold medications. Follow up with your PCP/internist in 3-4 weeks to review medications.  Follow up with your PCP/internist.

## 2018-08-28 NOTE — CONSULT NOTE ADULT - SUBJECTIVE AND OBJECTIVE BOX
HPI:  47 year old female with morbid obesity lifetime despite attempts at diet, exercise, nutritionist and psychological .  Patient  decided in late winter of  that she wishes to undergo laparoscopic sleeve gastrectomy after numerous failed attempts at dietary weight loss.  Now day #1 post op with moderate abdominal pain and malaise.    PAST MEDICAL & SURGICAL HISTORY:  Polycystic ovarian syndrome  Urinary problem: over active bladder  Sleep apnea  Acid reflux  Hypertension  Hiatal hernia  Diabetes  Depression  Asthma  Arthritis  Hyperlipidemia  Anxiety  Elective surgery: D &amp; C x2  History of D&C: 2 D&amp;Cs before , after miscarriages.  H/O hernia repair: abdominal hernia repair 2012  Surgery, elective: C Section x2  ;       REVIEW OF SYSTEMS    General:  malaise	  Skin/Breast: normal  Ophthalmologic: negative  ENMT:	normal  Respiratory and Thorax: normal  Cardiovascular:	normal  Gastrointestinal:	abdominal pain  Genitourinary:	normal  Musculoskeletal:	 normal  Neurological:	normal  Psychiatric:	normal  Hematology/Lymphatics:	 negative  Endocrine:	negative  Allergic/Immunologic:	negative      MEDICATIONS  (STANDING):  ALBUTerol/ipratropium for Nebulization 3 milliLiter(s) Nebulizer every 6 hours  BUpivacaine liposome 1.3% Injectable (no eMAR) 20 milliLiter(s) Local Injection once  buPROPion XL . 150 milliGRAM(s) Oral daily  dextrose 5%. 1000 milliLiter(s) (50 mL/Hr) IV Continuous <Continuous>  dextrose 50% Injectable 12.5 Gram(s) IV Push once  dextrose 50% Injectable 25 Gram(s) IV Push once  dextrose 50% Injectable 25 Gram(s) IV Push once  escitalopram 20 milliGRAM(s) Oral daily  heparin  Injectable 5000 Unit(s) SubCutaneous every 8 hours  insulin lispro (HumaLOG) corrective regimen sliding scale   SubCutaneous Before meals and at bedtime  lactated ringers. 1000 milliLiter(s) (120 mL/Hr) IV Continuous <Continuous>  montelukast 10 milliGRAM(s) Oral daily  ondansetron Injectable 4 milliGRAM(s) IV Push every 6 hours  pantoprazole  Injectable 40 milliGRAM(s) IV Push daily  pregabalin 50 milliGRAM(s) Oral three times a day    MEDICATIONS  (PRN):  dextrose 40% Gel 15 Gram(s) Oral once PRN Blood Glucose LESS THAN 70 milliGRAM(s)/deciliter  glucagon  Injectable 1 milliGRAM(s) IntraMuscular once PRN Glucose LESS THAN 70 milligrams/deciliter  HYDROmorphone  Injectable 0.5 milliGRAM(s) IV Push every 4 hours PRN Moderate Pain (4 - 6)  HYDROmorphone  Injectable 1 milliGRAM(s) IV Push every 4 hours PRN Severe Pain (7 - 10)      Allergies    No Known Allergies    SOCIAL HISTORY: no cigs social alcohol    FAMILY HISTORY:  Hypertension (Father, Mother): Both parents had hypertension.  Family history of polycystic ovaries (Mother): Mother had symptoms of polycystic ovaries, and patient has PCOS.  Type 2 diabetes mellitus (Father, Mother): Both parents  from complications due to T2DM.      PHYSICAL EXAM:     Vital Signs Last 24 Hrs  T(C): 36.5 (28 Aug 2018 05:35), Max: 37 (28 Aug 2018 02:08)  T(F): 97.7 (28 Aug 2018 05:35), Max: 98.6 (28 Aug 2018 02:08)  HR: 80 (28 Aug 2018 05:46) (70 - 106)  BP: 171/78 (28 Aug 2018 05:46) (152/72 - 189/81)  BP(mean): 105 (28 Aug 2018 02:59) (95 - 146)  RR: 16 (28 Aug 2018 05:46) (10 - 18)  SpO2: 98% (28 Aug 2018 05:46) (93% - 98%)    Constitutional: WDWNF    Eyes: conj pale  ENMT: negative  Neck: supple  Breasts: not examined   Back: negative  Respiratory: clear to P&A  Cardiovascular: no MRGT or H  Gastrointestinal: normal bowel sounds no rebound  Genitourinary: neg  Rectal: not examined  Extremities: normal  Vascular: normal  Neurological: normal  Skin: negative  Lymph Nodes: negative  Musculoskeletal:      Psychiatric: anxiety      LABS:                        11.9   7.8   )-----------( 281      ( 28 Aug 2018 05:36 )             37.2     -    138  |  99  |  8   ----------------------------<  162<H>  4.5   |  21<L>  |  0.65    Ca    9.5      28 Aug 2018 05:37  Phos  4.8       Mg     1.7

## 2018-08-29 VITALS
HEART RATE: 90 BPM | OXYGEN SATURATION: 95 % | RESPIRATION RATE: 18 BRPM | SYSTOLIC BLOOD PRESSURE: 149 MMHG | DIASTOLIC BLOOD PRESSURE: 74 MMHG

## 2018-08-29 LAB
ANION GAP SERPL CALC-SCNC: 18 MMOL/L — HIGH (ref 5–17)
BUN SERPL-MCNC: 6 MG/DL — LOW (ref 7–23)
CALCIUM SERPL-MCNC: 9.8 MG/DL — SIGNIFICANT CHANGE UP (ref 8.4–10.5)
CHLORIDE SERPL-SCNC: 97 MMOL/L — SIGNIFICANT CHANGE UP (ref 96–108)
CO2 SERPL-SCNC: 23 MMOL/L — SIGNIFICANT CHANGE UP (ref 22–31)
CREAT SERPL-MCNC: 0.7 MG/DL — SIGNIFICANT CHANGE UP (ref 0.5–1.3)
GLUCOSE BLDC GLUCOMTR-MCNC: 139 MG/DL — HIGH (ref 70–99)
GLUCOSE BLDC GLUCOMTR-MCNC: 144 MG/DL — HIGH (ref 70–99)
GLUCOSE SERPL-MCNC: 145 MG/DL — HIGH (ref 70–99)
HCT VFR BLD CALC: 38.5 % — SIGNIFICANT CHANGE UP (ref 34.5–45)
HGB BLD-MCNC: 12.4 G/DL — SIGNIFICANT CHANGE UP (ref 11.5–15.5)
MAGNESIUM SERPL-MCNC: 1.8 MG/DL — SIGNIFICANT CHANGE UP (ref 1.6–2.6)
MCHC RBC-ENTMCNC: 28.6 PG — SIGNIFICANT CHANGE UP (ref 27–34)
MCHC RBC-ENTMCNC: 32.2 G/DL — SIGNIFICANT CHANGE UP (ref 32–36)
MCV RBC AUTO: 88.9 FL — SIGNIFICANT CHANGE UP (ref 80–100)
PHOSPHATE SERPL-MCNC: 2.6 MG/DL — SIGNIFICANT CHANGE UP (ref 2.5–4.5)
PLATELET # BLD AUTO: 309 K/UL — SIGNIFICANT CHANGE UP (ref 150–400)
POTASSIUM SERPL-MCNC: 4.2 MMOL/L — SIGNIFICANT CHANGE UP (ref 3.5–5.3)
POTASSIUM SERPL-SCNC: 4.2 MMOL/L — SIGNIFICANT CHANGE UP (ref 3.5–5.3)
RBC # BLD: 4.33 M/UL — SIGNIFICANT CHANGE UP (ref 3.8–5.2)
RBC # FLD: 15.5 % — SIGNIFICANT CHANGE UP (ref 10.3–16.9)
SODIUM SERPL-SCNC: 138 MMOL/L — SIGNIFICANT CHANGE UP (ref 135–145)
SURGICAL PATHOLOGY STUDY: SIGNIFICANT CHANGE UP
WBC # BLD: 8.6 K/UL — SIGNIFICANT CHANGE UP (ref 3.8–10.5)
WBC # FLD AUTO: 8.6 K/UL — SIGNIFICANT CHANGE UP (ref 3.8–10.5)

## 2018-08-29 PROCEDURE — C1889: CPT

## 2018-08-29 PROCEDURE — 83036 HEMOGLOBIN GLYCOSYLATED A1C: CPT

## 2018-08-29 PROCEDURE — 84100 ASSAY OF PHOSPHORUS: CPT

## 2018-08-29 PROCEDURE — 80048 BASIC METABOLIC PNL TOTAL CA: CPT

## 2018-08-29 PROCEDURE — 82962 GLUCOSE BLOOD TEST: CPT

## 2018-08-29 PROCEDURE — 94640 AIRWAY INHALATION TREATMENT: CPT

## 2018-08-29 PROCEDURE — 86850 RBC ANTIBODY SCREEN: CPT

## 2018-08-29 PROCEDURE — 83735 ASSAY OF MAGNESIUM: CPT

## 2018-08-29 PROCEDURE — 85027 COMPLETE CBC AUTOMATED: CPT

## 2018-08-29 PROCEDURE — 36415 COLL VENOUS BLD VENIPUNCTURE: CPT

## 2018-08-29 PROCEDURE — 86900 BLOOD TYPING SEROLOGIC ABO: CPT

## 2018-08-29 PROCEDURE — 88307 TISSUE EXAM BY PATHOLOGIST: CPT

## 2018-08-29 PROCEDURE — 86901 BLOOD TYPING SEROLOGIC RH(D): CPT

## 2018-08-29 RX ORDER — HYOSCYAMINE SULFATE 0.13 MG
1 TABLET ORAL
Qty: 15 | Refills: 0
Start: 2018-08-29

## 2018-08-29 RX ORDER — ASPIRIN/CALCIUM CARB/MAGNESIUM 324 MG
1 TABLET ORAL
Qty: 30 | Refills: 0
Start: 2018-08-29

## 2018-08-29 RX ORDER — DOCUSATE SODIUM 100 MG
1 CAPSULE ORAL
Qty: 30 | Refills: 0
Start: 2018-08-29

## 2018-08-29 RX ORDER — MAGNESIUM SULFATE 500 MG/ML
2 VIAL (ML) INJECTION ONCE
Refills: 0 | Status: COMPLETED | OUTPATIENT
Start: 2018-08-29 | End: 2018-08-29

## 2018-08-29 RX ORDER — PANTOPRAZOLE SODIUM 20 MG/1
1 TABLET, DELAYED RELEASE ORAL
Qty: 30 | Refills: 0
Start: 2018-08-29

## 2018-08-29 RX ORDER — HYDRALAZINE HCL 50 MG
10 TABLET ORAL ONCE
Refills: 0 | Status: COMPLETED | OUTPATIENT
Start: 2018-08-29 | End: 2018-08-29

## 2018-08-29 RX ORDER — OXYCODONE AND ACETAMINOPHEN 5; 325 MG/1; MG/1
1 TABLET ORAL
Qty: 20 | Refills: 0
Start: 2018-08-29

## 2018-08-29 RX ORDER — ONDANSETRON 8 MG/1
1 TABLET, FILM COATED ORAL
Qty: 15 | Refills: 0
Start: 2018-08-29

## 2018-08-29 RX ADMIN — HEPARIN SODIUM 5000 UNIT(S): 5000 INJECTION INTRAVENOUS; SUBCUTANEOUS at 05:50

## 2018-08-29 RX ADMIN — Medication 0.12 MILLIGRAM(S): at 09:34

## 2018-08-29 RX ADMIN — MONTELUKAST 10 MILLIGRAM(S): 4 TABLET, CHEWABLE ORAL at 11:12

## 2018-08-29 RX ADMIN — BUPROPION HYDROCHLORIDE 150 MILLIGRAM(S): 150 TABLET, EXTENDED RELEASE ORAL at 11:12

## 2018-08-29 RX ADMIN — Medication 50 GRAM(S): at 07:57

## 2018-08-29 RX ADMIN — ONDANSETRON 4 MILLIGRAM(S): 8 TABLET, FILM COATED ORAL at 05:48

## 2018-08-29 RX ADMIN — Medication 3 MILLILITER(S): at 09:33

## 2018-08-29 RX ADMIN — Medication 0.12 MILLIGRAM(S): at 02:31

## 2018-08-29 RX ADMIN — OXYCODONE AND ACETAMINOPHEN 2 TABLET(S): 5; 325 TABLET ORAL at 13:47

## 2018-08-29 RX ADMIN — SODIUM CHLORIDE 75 MILLILITER(S): 9 INJECTION, SOLUTION INTRAVENOUS at 09:34

## 2018-08-29 RX ADMIN — Medication 3 MILLILITER(S): at 05:48

## 2018-08-29 RX ADMIN — OXYCODONE AND ACETAMINOPHEN 2 TABLET(S): 5; 325 TABLET ORAL at 00:48

## 2018-08-29 RX ADMIN — OXYCODONE AND ACETAMINOPHEN 2 TABLET(S): 5; 325 TABLET ORAL at 06:22

## 2018-08-29 RX ADMIN — OXYCODONE AND ACETAMINOPHEN 2 TABLET(S): 5; 325 TABLET ORAL at 12:47

## 2018-08-29 RX ADMIN — Medication 0.12 MILLIGRAM(S): at 05:50

## 2018-08-29 RX ADMIN — Medication 10 MILLIGRAM(S): at 10:13

## 2018-08-29 RX ADMIN — Medication 50 MILLIGRAM(S): at 05:49

## 2018-08-29 RX ADMIN — HEPARIN SODIUM 5000 UNIT(S): 5000 INJECTION INTRAVENOUS; SUBCUTANEOUS at 13:05

## 2018-08-29 RX ADMIN — ONDANSETRON 4 MILLIGRAM(S): 8 TABLET, FILM COATED ORAL at 11:12

## 2018-08-29 RX ADMIN — ESCITALOPRAM OXALATE 20 MILLIGRAM(S): 10 TABLET, FILM COATED ORAL at 11:12

## 2018-08-29 RX ADMIN — PANTOPRAZOLE SODIUM 40 MILLIGRAM(S): 20 TABLET, DELAYED RELEASE ORAL at 11:12

## 2018-08-29 RX ADMIN — OXYCODONE AND ACETAMINOPHEN 2 TABLET(S): 5; 325 TABLET ORAL at 05:49

## 2018-08-29 RX ADMIN — Medication 0.12 MILLIGRAM(S): at 13:05

## 2018-08-29 NOTE — PROGRESS NOTE ADULT - SUBJECTIVE AND OBJECTIVE BOX
she feels well and is kacy adequate po. revd d/c packet. asked her to followe my nutrition guidelines. she will see me next tuesday

## 2018-08-29 NOTE — PROGRESS NOTE ADULT - ASSESSMENT
48yo F with PCOS, REGINO, GERD. HTN. DM. Asthma, HLD, Anxiety, depression, arthritis, hiatal hernia, h/o hernia repair, morbid obesity (BMI 34.5), for elective laparoscopic sleeve gastrectomy.     Pain control/nausea control  BCLD/IVF  ISS for diabetes  CPAP  Duoneb for asthma and Montelukast  SCDs and SQH  poss dc this afternoon 46yo F with PCOS, REGINO, GERD. HTN. DM. Asthma, HLD, Anxiety, depression, arthritis, hiatal hernia, h/o hernia repair, morbid obesity (BMI 34.5), for elective laparoscopic sleeve gastrectomy.     Pain control/nausea control  BCLD/IVF  ISS for diabetes  CPAP  Duoneb for asthma and Montelukast  SCDs and SQH  poss dc this afternoon

## 2018-08-29 NOTE — PROGRESS NOTE ADULT - SUBJECTIVE AND OBJECTIVE BOX
HPI:  47 year old female with morbid obesity lifetime despite attempts at diet, exercise, nutritionist and psychological .  Patient  decided in late winter of 2017 that she wishes to undergo laparoscopic sleeve gastrectomy after numerous failed attempts at dietary weight loss.  Now day #2 post op with moderate abdominal pain and malaise.    PAST MEDICAL & SURGICAL HISTORY:  Polycystic ovarian syndrome  Urinary problem: over active bladder  Sleep apnea  Acid reflux  Hypertension  Hiatal hernia  Diabetes  Depression  Asthma  Arthritis  Hyperlipidemia  Anxiety  Elective surgery: D &amp; C x2  History of D&C: 2 D&amp;Cs before 2008, after miscarriages.  H/O hernia repair: abdominal hernia repair 2012  Surgery, elective: C Section x2  2008; 2011      MEDICATIONS  (STANDING):  ALBUTerol/ipratropium for Nebulization 3 milliLiter(s) Nebulizer every 6 hours  BUpivacaine liposome 1.3% Injectable (no eMAR) 20 milliLiter(s) Local Injection once  buPROPion XL . 150 milliGRAM(s) Oral daily  dextrose 5%. 1000 milliLiter(s) (50 mL/Hr) IV Continuous <Continuous>  dextrose 50% Injectable 12.5 Gram(s) IV Push once  dextrose 50% Injectable 25 Gram(s) IV Push once  dextrose 50% Injectable 25 Gram(s) IV Push once  escitalopram 20 milliGRAM(s) Oral daily  heparin  Injectable 5000 Unit(s) SubCutaneous every 8 hours  hyoscyamine SL 0.125 milliGRAM(s) SubLingual every 4 hours  insulin lispro (HumaLOG) corrective regimen sliding scale   SubCutaneous Before meals and at bedtime  lactated ringers. 1000 milliLiter(s) (75 mL/Hr) IV Continuous <Continuous>  montelukast 10 milliGRAM(s) Oral daily  ondansetron Injectable 4 milliGRAM(s) IV Push every 6 hours  pantoprazole  Injectable 40 milliGRAM(s) IV Push daily  pregabalin 50 milliGRAM(s) Oral three times a day    MEDICATIONS  (PRN):  dextrose 40% Gel 15 Gram(s) Oral once PRN Blood Glucose LESS THAN 70 milliGRAM(s)/deciliter  glucagon  Injectable 1 milliGRAM(s) IntraMuscular once PRN Glucose LESS THAN 70 milligrams/deciliter  oxyCODONE    5 mG/acetaminophen 325 mG 1 Tablet(s) Oral every 4 hours PRN Moderate Pain (4 - 6)  oxyCODONE    5 mG/acetaminophen 325 mG 2 Tablet(s) Oral every 4 hours PRN Severe Pain (7 - 10)  simethicone 80 milliGRAM(s) Chew every 6 hours PRN Gas      Allergies    No Known Allergies    REVIEW OF SYSTEMS    General:  malaise	  Skin/Breast: normal  Ophthalmologic: negative  ENMT:	normal  Respiratory and Thorax: normal  Cardiovascular:	normal  Gastrointestinal:	 abdominal pain  Genitourinary:	normal  Musculoskeletal:	 normal  Neurological:	normal  Psychiatric:	normal  Hematology/Lymphatics:	 negative  Endocrine:	negative  Allergic/Immunologic:	negative      PHYSICAL EXAM:  Daily NS AS Nutri Dx Weight: Overweight/obesity (28 Aug 2018 14:02)    Vital Signs Last 24 Hrs  T(C): 36.6 (29 Aug 2018 04:59), Max: 37.6 (28 Aug 2018 21:38)  T(F): 97.9 (29 Aug 2018 04:59), Max: 99.6 (28 Aug 2018 21:38)  HR: 105 (29 Aug 2018 01:00) (72 - 107)  BP: 164/75 (29 Aug 2018 00:00) (140/71 - 194/84)  BP(mean): 106 (29 Aug 2018 00:00) (93 - 120)  RR: 17 (29 Aug 2018 00:00) (16 - 18)  SpO2: 91% (29 Aug 2018 01:00) (91% - 97%)    Constitutional: WDWNF    Eyes: conj pale  ENMT: negative  Neck: supple  Breasts: not examined   Back: negative  Respiratory: clear to P&A  Cardiovascular: no MRGT or H  Gastrointestinal: normal bowel sounds  Genitourinary: neg  Rectal: not examined  Extremities: normal  Vascular: normal  Neurological: normal  Skin: negative  Lymph Nodes: negative  Musculoskeletal:   normal  Psychiatric: anxiety                  11.9   7.8   )-----------( 281      ( 28 Aug 2018 05:36 )             37.2       08-28    138  |  99  |  8   ----------------------------<  162<H>  4.5   |  21<L>  |  0.65    Ca    9.5      28 Aug 2018 05:37  Phos  4.8     08-28  Mg     1.7     08-28

## 2018-08-29 NOTE — PROGRESS NOTE ADULT - SUBJECTIVE AND OBJECTIVE BOX
INTERVAL HPI/OVERNIGHT EVENTS: BALBINA    STATUS POST:  lap sleeve gastrectomy, hiatal hernia repair    POST OPERATIVE DAY #: 2    SUBJECTIVE:  pt seen at bedside, complains of some upper abdominal pain, better controlled    MEDICATIONS  (STANDING):  ALBUTerol/ipratropium for Nebulization 3 milliLiter(s) Nebulizer every 6 hours  BUpivacaine liposome 1.3% Injectable (no eMAR) 20 milliLiter(s) Local Injection once  buPROPion XL . 150 milliGRAM(s) Oral daily  dextrose 5%. 1000 milliLiter(s) (50 mL/Hr) IV Continuous <Continuous>  dextrose 50% Injectable 12.5 Gram(s) IV Push once  dextrose 50% Injectable 25 Gram(s) IV Push once  dextrose 50% Injectable 25 Gram(s) IV Push once  escitalopram 20 milliGRAM(s) Oral daily  heparin  Injectable 5000 Unit(s) SubCutaneous every 8 hours  hyoscyamine SL 0.125 milliGRAM(s) SubLingual every 4 hours  insulin lispro (HumaLOG) corrective regimen sliding scale   SubCutaneous Before meals and at bedtime  lactated ringers. 1000 milliLiter(s) (75 mL/Hr) IV Continuous <Continuous>  montelukast 10 milliGRAM(s) Oral daily  ondansetron Injectable 4 milliGRAM(s) IV Push every 6 hours  pantoprazole  Injectable 40 milliGRAM(s) IV Push daily  pregabalin 50 milliGRAM(s) Oral three times a day    MEDICATIONS  (PRN):  dextrose 40% Gel 15 Gram(s) Oral once PRN Blood Glucose LESS THAN 70 milliGRAM(s)/deciliter  glucagon  Injectable 1 milliGRAM(s) IntraMuscular once PRN Glucose LESS THAN 70 milligrams/deciliter  oxyCODONE    5 mG/acetaminophen 325 mG 1 Tablet(s) Oral every 4 hours PRN Moderate Pain (4 - 6)  oxyCODONE    5 mG/acetaminophen 325 mG 2 Tablet(s) Oral every 4 hours PRN Severe Pain (7 - 10)  simethicone 80 milliGRAM(s) Chew every 6 hours PRN Gas      Vital Signs Last 24 Hrs  T(C): 36.6 (29 Aug 2018 04:59), Max: 37.6 (28 Aug 2018 21:38)  T(F): 97.9 (29 Aug 2018 04:59), Max: 99.6 (28 Aug 2018 21:38)  HR: 108 (29 Aug 2018 06:31) (76 - 108)  BP: 164/75 (29 Aug 2018 00:00) (140/71 - 176/79)  BP(mean): 106 (29 Aug 2018 00:00) (93 - 113)  RR: 17 (29 Aug 2018 00:00) (16 - 18)  SpO2: 92% (29 Aug 2018 06:31) (91% - 97%)    PHYSICAL EXAM:      Constitutional: A&Ox3    Respiratory: non labored breathing, no respiratory distress    Cardiovascular: NSR, RRR    Gastrointestinal: soft, nondistended, mild incisional tenderness, incisions c/d/i    Extremities: (-) edema                  I&O's Detail    28 Aug 2018 07:01  -  29 Aug 2018 07:00  --------------------------------------------------------  IN:    IV PiggyBack: 50 mL    lactated ringers.: 825 mL  Total IN: 875 mL    OUT:    Voided: 1300 mL  Total OUT: 1300 mL    Total NET: -425 mL          LABS:                        12.4   8.6   )-----------( 309      ( 29 Aug 2018 05:45 )             38.5     08-29    138  |  97  |  6<L>  ----------------------------<  145<H>  4.2   |  23  |  0.70    Ca    9.8      29 Aug 2018 05:45  Phos  2.6     08-29  Mg     1.8     08-29            RADIOLOGY & ADDITIONAL STUDIES:

## 2018-09-04 DIAGNOSIS — E28.2 POLYCYSTIC OVARIAN SYNDROME: ICD-10-CM

## 2018-09-04 DIAGNOSIS — E66.01 MORBID (SEVERE) OBESITY DUE TO EXCESS CALORIES: ICD-10-CM

## 2018-09-04 DIAGNOSIS — N32.81 OVERACTIVE BLADDER: ICD-10-CM

## 2018-09-04 DIAGNOSIS — K21.9 GASTRO-ESOPHAGEAL REFLUX DISEASE WITHOUT ESOPHAGITIS: ICD-10-CM

## 2018-09-04 DIAGNOSIS — Z79.84 LONG TERM (CURRENT) USE OF ORAL HYPOGLYCEMIC DRUGS: ICD-10-CM

## 2018-09-04 DIAGNOSIS — F41.9 ANXIETY DISORDER, UNSPECIFIED: ICD-10-CM

## 2018-09-04 DIAGNOSIS — G47.33 OBSTRUCTIVE SLEEP APNEA (ADULT) (PEDIATRIC): ICD-10-CM

## 2018-09-04 DIAGNOSIS — J45.909 UNSPECIFIED ASTHMA, UNCOMPLICATED: ICD-10-CM

## 2018-09-04 DIAGNOSIS — M19.90 UNSPECIFIED OSTEOARTHRITIS, UNSPECIFIED SITE: ICD-10-CM

## 2018-09-04 DIAGNOSIS — E11.9 TYPE 2 DIABETES MELLITUS WITHOUT COMPLICATIONS: ICD-10-CM

## 2018-09-04 DIAGNOSIS — F32.9 MAJOR DEPRESSIVE DISORDER, SINGLE EPISODE, UNSPECIFIED: ICD-10-CM

## 2018-09-04 DIAGNOSIS — K44.9 DIAPHRAGMATIC HERNIA WITHOUT OBSTRUCTION OR GANGRENE: ICD-10-CM

## 2018-09-04 DIAGNOSIS — I10 ESSENTIAL (PRIMARY) HYPERTENSION: ICD-10-CM

## 2018-09-04 DIAGNOSIS — E78.5 HYPERLIPIDEMIA, UNSPECIFIED: ICD-10-CM

## 2019-08-15 NOTE — PATIENT PROFILE ADULT. - PAIN CHRONIC, PROFILE
I can refill the medication, but she has to come by and pick it up since it is now a controlled substance. It will be up front.     Yong Cordoba MD  Family Medicine Resident no

## 2020-08-07 ENCOUNTER — TRANSCRIPTION ENCOUNTER (OUTPATIENT)
Age: 50
End: 2020-08-07

## 2021-09-01 NOTE — PATIENT PROFILE ADULT. - NS PRO TALK SOMEONE YN
no caffeine Calcipotriene Pregnancy And Lactation Text: This medication has not been proven safe during pregnancy. It is unknown if this medication is excreted in breast milk.

## 2022-01-06 ENCOUNTER — TRANSCRIPTION ENCOUNTER (OUTPATIENT)
Age: 52
End: 2022-01-06

## 2022-01-12 ENCOUNTER — TRANSCRIPTION ENCOUNTER (OUTPATIENT)
Age: 52
End: 2022-01-12

## 2022-03-17 NOTE — PATIENT PROFILE ADULT. - CAREGIVER
Called pt, she is overdue for a mammogram, pt declined      Meera Alejo/vinay  03/17/22  10:06 AM
Declines

## 2022-05-18 ENCOUNTER — NON-APPOINTMENT (OUTPATIENT)
Age: 52
End: 2022-05-18

## 2022-05-31 ENCOUNTER — NON-APPOINTMENT (OUTPATIENT)
Age: 52
End: 2022-05-31

## 2022-09-11 ENCOUNTER — NON-APPOINTMENT (OUTPATIENT)
Age: 52
End: 2022-09-11

## 2023-06-23 ENCOUNTER — APPOINTMENT (OUTPATIENT)
Dept: ORTHOPEDIC SURGERY | Facility: CLINIC | Age: 53
End: 2023-06-23
Payer: COMMERCIAL

## 2023-06-23 VITALS — BODY MASS INDEX: 35.88 KG/M2 | HEIGHT: 62.5 IN | WEIGHT: 200 LBS

## 2023-06-23 PROCEDURE — 99204 OFFICE O/P NEW MOD 45 MIN: CPT | Mod: 25

## 2023-06-23 PROCEDURE — 73564 X-RAY EXAM KNEE 4 OR MORE: CPT | Mod: 50

## 2023-06-23 PROCEDURE — 20610 DRAIN/INJ JOINT/BURSA W/O US: CPT | Mod: RT

## 2023-06-23 NOTE — ASSESSMENT
[FreeTextEntry1] : 53 y/o F R knee OA\par \par R Knee CSI\par Follow up in 1-2 weeks for CSI left knee

## 2023-06-23 NOTE — PROCEDURE
[FreeTextEntry3] : The risks, benefits and alternatives to the injection were discussed with the patient. The decision was made to proceed with the injection to reduce inflammation within the area. Verbal consent was obtained for the procedure. The right knee was cleaned with alcohol and ethyl chloride was sprayed topically. 1cc of 40mg Kenalog and 4cc of 1% lidocaine was injected. The patient tolerated the procedure well and was instructed to ice the affected joint as needed later today. Activities can be performed as tolerated\par \par

## 2023-06-23 NOTE — IMAGING
[de-identified] : Constitutional: well developed and well nourished, able to communicate\par Cardiovascular: Peripheral vascular exam is grossly normal\par Neurologic: Alert and oriented, no acute distress.\par Skin: normal skin with no ulcers, rashes, or lesions\par Pulmonary: No respiratory distress, breathing comfortably on room air\par Lymphatics: No obvious lymphadenopathy or lymphedema in areas examined\par \par bilateral KNEE EXAM\par Alignment: Varus \par Effusion: None\par Atrophy: None                                                 \par Stable to Varus/valgus stress\par Posterior Drawer Test: negative\par Anterior Drawer Test: Negative\par Knee Extension/Flexion: 0 / 120\par \par Medial/lateral compartments\par Medial joint line: POS Tenderness\par Lateral joint line: No Tenderness\par Tashi test: negative\par \par Patellofemoral joint\par Medial patellar facet: no tenderness\par Patellar grind: Negative\par \par Tendons:\par Pes Anserine: No tenderness\par Gerdy’s Tubercle/ IT Band: No tenderness\par Quadriceps Tendon: No Tenderness\par patellar tendon: no Tenderness\par Tibial tubercle: not tenderness\par Calf: no Tenderness\par \par Neurovascular exam\par Muscle strength: 5/5\par Sensation to light touch: intact\par Distal pulses: 2+\par \par IMAGING:\par 06/23/2023 Xrays of the Right Knee were taken demonstrating

## 2023-06-23 NOTE — HISTORY OF PRESENT ILLNESS
[Gradual] : gradual [5] : 5 [4] : 4 [Dull/Aching] : dull/aching [Throbbing] : throbbing [Occasional] : occasional [de-identified] : 06/23/2023 Ms. STEFF CADENA is a 52 year female that comes in today with a chief complaint of Abdirashid knee pt states she has history of knee pain  [] : no [FreeTextEntry1] : Abdirashid knee

## 2023-06-26 ENCOUNTER — TRANSCRIPTION ENCOUNTER (OUTPATIENT)
Age: 53
End: 2023-06-26

## 2023-07-20 ENCOUNTER — APPOINTMENT (OUTPATIENT)
Dept: ORTHOPEDIC SURGERY | Facility: CLINIC | Age: 53
End: 2023-07-20
Payer: COMMERCIAL

## 2023-07-20 VITALS — WEIGHT: 200 LBS | HEIGHT: 62 IN | BODY MASS INDEX: 36.8 KG/M2

## 2023-07-20 PROCEDURE — 99214 OFFICE O/P EST MOD 30 MIN: CPT | Mod: 25

## 2023-07-20 PROCEDURE — 20610 DRAIN/INJ JOINT/BURSA W/O US: CPT | Mod: RT

## 2023-07-20 NOTE — HISTORY OF PRESENT ILLNESS
[Localized] : localized [Sharp] : sharp [Occasional] : occasional [Leisure] : leisure [Meds] : meds [Walking] : walking [Stairs] : stairs [Gradual] : gradual [5] : 5 [4] : 4 [Dull/Aching] : dull/aching [Throbbing] : throbbing [de-identified] : 05/23/23 [de-identified] : Dr. Gan [de-identified] : Patient occasionally takes ibuprofen and Tylenol. [de-identified] : 06/23/2023 Ms. STEFF CADENA is a 52 year female that comes in today with a chief complaint of Abdirashid knee pt states she has history of knee pain \par \par 07/20/2023 follow up visit  [] : no [FreeTextEntry1] : Abdirashid knee

## 2023-07-20 NOTE — IMAGING
[de-identified] : Constitutional: well developed and well nourished, able to communicate\par Cardiovascular: Peripheral vascular exam is grossly normal\par Neurologic: Alert and oriented, no acute distress.\par Skin: normal skin with no ulcers, rashes, or lesions\par Pulmonary: No respiratory distress, breathing comfortably on room air\par Lymphatics: No obvious lymphadenopathy or lymphedema in areas examined\par \par bilateral KNEE EXAM\par Alignment: Varus \par Effusion: None\par Atrophy: None                                                 \par Stable to Varus/valgus stress\par Posterior Drawer Test: negative\par Anterior Drawer Test: Negative\par Knee Extension/Flexion: 0 / 120\par \par Medial/lateral compartments\par Medial joint line: POS Tenderness\par Lateral joint line: No Tenderness\par Tashi test: negative\par \par Patellofemoral joint\par Medial patellar facet: no tenderness\par Patellar grind: Negative\par \par Tendons:\par Pes Anserine: No tenderness\par Gerdy’s Tubercle/ IT Band: No tenderness\par Quadriceps Tendon: No Tenderness\par patellar tendon: no Tenderness\par Tibial tubercle: not tenderness\par Calf: no Tenderness\par \par Neurovascular exam\par Muscle strength: 5/5\par Sensation to light touch: intact\par Distal pulses: 2+\par \par IMAGING:\par 06/23/2023 Xrays of the Right Knee were taken demonstrating

## 2023-07-20 NOTE — IMAGING
[de-identified] : Constitutional: well developed and well nourished, able to communicate\par Cardiovascular: Peripheral vascular exam is grossly normal\par Neurologic: Alert and oriented, no acute distress.\par Skin: normal skin with no ulcers, rashes, or lesions\par Pulmonary: No respiratory distress, breathing comfortably on room air\par Lymphatics: No obvious lymphadenopathy or lymphedema in areas examined\par \par bilateral KNEE EXAM\par Alignment: Varus \par Effusion: None\par Atrophy: None                                                 \par Stable to Varus/valgus stress\par Posterior Drawer Test: negative\par Anterior Drawer Test: Negative\par Knee Extension/Flexion: 0 / 120\par \par Medial/lateral compartments\par Medial joint line: POS Tenderness\par Lateral joint line: No Tenderness\par Tashi test: negative\par \par Patellofemoral joint\par Medial patellar facet: no tenderness\par Patellar grind: Negative\par \par Tendons:\par Pes Anserine: No tenderness\par Gerdy’s Tubercle/ IT Band: No tenderness\par Quadriceps Tendon: No Tenderness\par patellar tendon: no Tenderness\par Tibial tubercle: not tenderness\par Calf: no Tenderness\par \par Neurovascular exam\par Muscle strength: 5/5\par Sensation to light touch: intact\par Distal pulses: 2+\par \par IMAGING:\par 06/23/2023 Xrays of the Right Knee were taken demonstrating

## 2023-07-20 NOTE — HISTORY OF PRESENT ILLNESS
[Localized] : localized [Sharp] : sharp [Occasional] : occasional [Leisure] : leisure [Meds] : meds [Walking] : walking [Stairs] : stairs [Gradual] : gradual [5] : 5 [4] : 4 [Dull/Aching] : dull/aching [Throbbing] : throbbing [de-identified] : 05/23/23 [de-identified] : Dr. Gan [de-identified] : Patient occasionally takes ibuprofen and Tylenol. [de-identified] : 06/23/2023 Ms. STEFF CADENA is a 52 year female that comes in today with a chief complaint of Abdirashid knee pt states she has history of knee pain \par \par 07/20/2023 follow up visit  [] : no [FreeTextEntry1] : Abdirashid knee

## 2023-07-20 NOTE — ASSESSMENT
[FreeTextEntry1] : 51 y/o F R knee OA\par \par R Knee CSI\par Follow up in 1-2 weeks for CSI left knee

## 2023-08-06 ENCOUNTER — NON-APPOINTMENT (OUTPATIENT)
Age: 53
End: 2023-08-06

## 2023-08-28 ENCOUNTER — NON-APPOINTMENT (OUTPATIENT)
Age: 53
End: 2023-08-28

## 2023-10-13 NOTE — PRE-OP CHECKLIST - PATIENT'S PERSONAL PROPERTY GIVEN TO
Future Appointments    Encounter Information    Provider Department Appt Notes   5/16/2024 Leah Yao MD [facility] Cardiology YEARLY   8/16/2024 Kareen Canela MD [facility] Primary Care AWV     Past Visits    Date Provider Specialty Visit Type Primary Dx   08/15/2023 Kareen Canela MD Family Medicine Office Visit Medicare annual wellness visit, subsequent
friend/significant other

## 2023-10-24 ENCOUNTER — APPOINTMENT (OUTPATIENT)
Dept: ORTHOPEDIC SURGERY | Facility: CLINIC | Age: 53
End: 2023-10-24
Payer: COMMERCIAL

## 2023-10-24 VITALS — BODY MASS INDEX: 36.8 KG/M2 | WEIGHT: 200 LBS | HEIGHT: 62 IN

## 2023-10-24 PROCEDURE — 99213 OFFICE O/P EST LOW 20 MIN: CPT | Mod: 25

## 2023-10-24 PROCEDURE — 20610 DRAIN/INJ JOINT/BURSA W/O US: CPT | Mod: RT

## 2023-12-11 ENCOUNTER — NON-APPOINTMENT (OUTPATIENT)
Age: 53
End: 2023-12-11

## 2023-12-14 ENCOUNTER — NON-APPOINTMENT (OUTPATIENT)
Age: 53
End: 2023-12-14

## 2024-01-14 RX ORDER — ASCORBIC ACID 60 MG
1 TABLET,CHEWABLE ORAL
Qty: 0 | Refills: 0 | DISCHARGE

## 2024-01-14 RX ORDER — BUPROPION HYDROCHLORIDE 150 MG/1
0 TABLET, EXTENDED RELEASE ORAL
Qty: 0 | Refills: 0 | DISCHARGE

## 2024-01-14 RX ORDER — CHOLECALCIFEROL (VITAMIN D3) 125 MCG
0 CAPSULE ORAL
Qty: 0 | Refills: 0 | DISCHARGE

## 2024-01-14 RX ORDER — MONTELUKAST 4 MG/1
1 TABLET, CHEWABLE ORAL
Qty: 0 | Refills: 0 | DISCHARGE

## 2024-01-14 RX ORDER — ESCITALOPRAM OXALATE 10 MG/1
1 TABLET, FILM COATED ORAL
Qty: 0 | Refills: 0 | DISCHARGE

## 2024-01-14 RX ORDER — DEXLANSOPRAZOLE 30 MG/1
1 CAPSULE, DELAYED RELEASE ORAL
Qty: 0 | Refills: 0 | DISCHARGE

## 2024-01-14 RX ORDER — CETIRIZINE HYDROCHLORIDE 10 MG/1
1 TABLET ORAL
Qty: 0 | Refills: 0 | DISCHARGE

## 2024-01-14 RX ORDER — PHENTERMINE HCL 30 MG
1 CAPSULE ORAL
Qty: 0 | Refills: 0 | DISCHARGE

## 2024-01-14 RX ORDER — SPIRONOLACTONE 25 MG/1
0 TABLET, FILM COATED ORAL
Qty: 0 | Refills: 0 | DISCHARGE

## 2024-01-14 RX ORDER — THIAMINE MONONITRATE (VIT B1) 100 MG
1 TABLET ORAL
Qty: 0 | Refills: 0 | DISCHARGE

## 2024-01-14 RX ORDER — FLUTICASONE FUROATE AND VILANTEROL TRIFENATATE 100; 25 UG/1; UG/1
0 POWDER RESPIRATORY (INHALATION)
Qty: 0 | Refills: 0 | DISCHARGE

## 2024-01-14 RX ORDER — METFORMIN HYDROCHLORIDE 850 MG/1
0 TABLET ORAL
Qty: 0 | Refills: 0 | DISCHARGE

## 2024-01-14 RX ORDER — ATORVASTATIN CALCIUM 80 MG/1
1 TABLET, FILM COATED ORAL
Qty: 0 | Refills: 0 | DISCHARGE

## 2024-01-23 PROBLEM — E28.2 POLYCYSTIC OVARIAN SYNDROME: Chronic | Status: ACTIVE | Noted: 2018-08-27

## 2024-01-23 PROBLEM — K44.9 DIAPHRAGMATIC HERNIA WITHOUT OBSTRUCTION OR GANGRENE: Chronic | Status: ACTIVE | Noted: 2018-08-17

## 2024-01-23 PROBLEM — J45.909 UNSPECIFIED ASTHMA, UNCOMPLICATED: Chronic | Status: ACTIVE | Noted: 2018-08-17

## 2024-01-23 PROBLEM — F32.9 MAJOR DEPRESSIVE DISORDER, SINGLE EPISODE, UNSPECIFIED: Chronic | Status: ACTIVE | Noted: 2018-08-17

## 2024-01-23 PROBLEM — I10 ESSENTIAL (PRIMARY) HYPERTENSION: Chronic | Status: ACTIVE | Noted: 2018-08-17

## 2024-01-23 PROBLEM — F41.9 ANXIETY DISORDER, UNSPECIFIED: Chronic | Status: ACTIVE | Noted: 2018-08-17

## 2024-01-23 PROBLEM — M19.90 UNSPECIFIED OSTEOARTHRITIS, UNSPECIFIED SITE: Chronic | Status: ACTIVE | Noted: 2018-08-17

## 2024-01-23 PROBLEM — K21.9 GASTRO-ESOPHAGEAL REFLUX DISEASE WITHOUT ESOPHAGITIS: Chronic | Status: ACTIVE | Noted: 2018-08-17

## 2024-01-23 PROBLEM — R39.89 OTHER SYMPTOMS AND SIGNS INVOLVING THE GENITOURINARY SYSTEM: Chronic | Status: ACTIVE | Noted: 2018-08-17

## 2024-01-23 PROBLEM — E78.5 HYPERLIPIDEMIA, UNSPECIFIED: Chronic | Status: ACTIVE | Noted: 2018-08-17

## 2024-01-23 PROBLEM — E11.9 TYPE 2 DIABETES MELLITUS WITHOUT COMPLICATIONS: Chronic | Status: ACTIVE | Noted: 2018-08-17

## 2024-01-23 PROBLEM — G47.30 SLEEP APNEA, UNSPECIFIED: Chronic | Status: ACTIVE | Noted: 2018-08-17

## 2024-01-26 ENCOUNTER — APPOINTMENT (OUTPATIENT)
Dept: ORTHOPEDIC SURGERY | Facility: CLINIC | Age: 54
End: 2024-01-26
Payer: COMMERCIAL

## 2024-01-26 VITALS — HEIGHT: 62 IN | WEIGHT: 200 LBS | BODY MASS INDEX: 36.8 KG/M2

## 2024-01-26 PROCEDURE — 99213 OFFICE O/P EST LOW 20 MIN: CPT

## 2024-01-26 RX ORDER — MELOXICAM 15 MG/1
15 TABLET ORAL
Qty: 30 | Refills: 0 | Status: ACTIVE | COMMUNITY
Start: 2024-01-26 | End: 1900-01-01

## 2024-01-26 NOTE — ASSESSMENT
[FreeTextEntry1] : 53 y/o F R knee OA R Knee CSI Follow up in 1-2 weeks for CSI left knee  7/20/23: L Knee CSI Follow up PRN.  10/24/2023 likely rupture bakers cyst. CSI of right knee today and PRN fu  1/26/24: MRI R knee, no relief with conservative management Meloxicam prescribed-Cr 0.80 MRI of the right knee to r/o subchondral fx potential gel injections in the future as this has worked previously

## 2024-01-26 NOTE — HISTORY OF PRESENT ILLNESS
[Gradual] : gradual [5] : 5 [4] : 4 [Dull/Aching] : dull/aching [Localized] : localized [Sharp] : sharp [Throbbing] : throbbing [Occasional] : occasional [Leisure] : leisure [Meds] : meds [Walking] : walking [Stairs] : stairs [de-identified] : 06/23/2023 Ms. STEFF CADENA is a 52 year female that comes in today with a chief complaint of Abdirashid knee pt states she has history of knee pain   7/20/23: Here for L knee pain. Good result with R knee CSI.  10/24/2023 Ms. STEFF CADENA is a 52 year female that comes in today for follow up Abdirashid knee pain. Right is worse, pain is going down leg.  1/26/24: Here for follow up. Reports no relief with CSI. No relief with voltaren.    [] : This patient has had an injection before: no [FreeTextEntry1] : Abdirashid knee  [de-identified] : 05/23/23 [de-identified] : Dr. Gan [de-identified] : Patient occasionally takes ibuprofen and Tylenol.

## 2024-01-26 NOTE — IMAGING
[de-identified] : Constitutional: well developed and well nourished, able to communicate\par  Cardiovascular: Peripheral vascular exam is grossly normal\par  Neurologic: Alert and oriented, no acute distress.\par  Skin: normal skin with no ulcers, rashes, or lesions\par  Pulmonary: No respiratory distress, breathing comfortably on room air\par  Lymphatics: No obvious lymphadenopathy or lymphedema in areas examined\par  \par  bilateral KNEE EXAM\par  Alignment: Varus \par  Effusion: None\par  Atrophy: None                                                 \par  Stable to Varus/valgus stress\par  Posterior Drawer Test: negative\par  Anterior Drawer Test: Negative\par  Knee Extension/Flexion: 0 / 120\par  \par  Medial/lateral compartments\par  Medial joint line: POS Tenderness\par  Lateral joint line: No Tenderness\par  Tashi test: negative\par  \par  Patellofemoral joint\par  Medial patellar facet: no tenderness\par  Patellar grind: Negative\par  \par  Tendons:\par  Pes Anserine: No tenderness\par  Gerdy's Tubercle/ IT Band: No tenderness\par  Quadriceps Tendon: No Tenderness\par  patellar tendon: no Tenderness\par  Tibial tubercle: not tenderness\par  Calf: no Tenderness\par  \par  Neurovascular exam\par  Muscle strength: 5/5\par  Sensation to light touch: intact\par  Distal pulses: 2+\par  \par  IMAGING:\par  06/23/2023 Xrays of the Right Knee were taken demonstrating

## 2024-02-05 ENCOUNTER — APPOINTMENT (OUTPATIENT)
Dept: MRI IMAGING | Facility: CLINIC | Age: 54
End: 2024-02-05

## 2024-02-20 ENCOUNTER — APPOINTMENT (OUTPATIENT)
Dept: ORTHOPEDIC SURGERY | Facility: CLINIC | Age: 54
End: 2024-02-20
Payer: COMMERCIAL

## 2024-02-20 VITALS — BODY MASS INDEX: 36.8 KG/M2 | WEIGHT: 200 LBS | HEIGHT: 62 IN

## 2024-02-20 PROCEDURE — 99213 OFFICE O/P EST LOW 20 MIN: CPT

## 2024-02-20 PROCEDURE — 73564 X-RAY EXAM KNEE 4 OR MORE: CPT | Mod: 50

## 2024-02-20 RX ORDER — MELOXICAM 15 MG/1
15 TABLET ORAL
Qty: 30 | Refills: 0 | Status: ACTIVE | COMMUNITY
Start: 2024-02-20 | End: 1900-01-01

## 2024-02-20 NOTE — ASSESSMENT
[FreeTextEntry1] : 53 y/o F R knee OA R Knee CSI Follow up in 1-2 weeks for CSI left knee  7/20/23: L Knee CSI Follow up PRN.  10/24/2023 likely rupture bakers cyst. CSI of right knee today and PRN fu  1/26/24: MRI R knee, no relief with conservative management Meloxicam prescribed-Cr 0.80 MRI of the right knee to r/o subchondral fx potential gel injections in the future as this has worked previously   2/20/24: MRI R knee, no relief with conservative management Meloxicam renewed Follow up once MRI completed

## 2024-02-20 NOTE — HISTORY OF PRESENT ILLNESS
[Gradual] : gradual [5] : 5 [4] : 4 [Dull/Aching] : dull/aching [Localized] : localized [Sharp] : sharp [Throbbing] : throbbing [Occasional] : occasional [Leisure] : leisure [Meds] : meds [Walking] : walking [Stairs] : stairs [de-identified] : 06/23/2023 Ms. STEFF CADENA is a 52 year female that comes in today with a chief complaint of Abdirashid knee pt states she has history of knee pain   7/20/23: Here for L knee pain. Good result with R knee CSI.  10/24/2023 Ms. STEFF CADENA is a 52 year female that comes in today for follow up Abdirashid knee pain. Right is worse, pain is going down leg.  1/26/24: Here for follow up. Reports no relief with CSI. No relief with voltaren.   2/20/24: Here for follow up. Both knees with severe pain. Meloxicam with mild temporary relief.    [] : Post Surgical Visit: no [FreeTextEntry1] : Abdirashid knee  [de-identified] : 05/23/23 [de-identified] : Dr. Gan [de-identified] : Patient occasionally takes ibuprofen and Tylenol.

## 2024-02-20 NOTE — IMAGING
[de-identified] : Constitutional: well developed and well nourished, able to communicate Cardiovascular: Peripheral vascular exam is grossly normal Neurologic: Alert and oriented, no acute distress. Skin: normal skin with no ulcers, rashes, or lesions Pulmonary: No respiratory distress, breathing comfortably on room air Lymphatics: No obvious lymphadenopathy or lymphedema in areas examined  bilateral KNEE EXAM Alignment: Varus  Effusion: None Atrophy: None                                                  Stable to Varus/valgus stress Posterior Drawer Test: negative Anterior Drawer Test: Negative Knee Extension/Flexion: 0 / 120  Medial/lateral compartments Medial joint line: POS Tenderness Lateral joint line: No Tenderness Tashi test: negative  Patellofemoral joint Medial patellar facet: no tenderness Patellar grind: Negative  Tendons: Pes Anserine: No tenderness Gerdy's Tubercle/ IT Band: No tenderness Quadriceps Tendon: No Tenderness patellar tendon: no Tenderness Tibial tubercle: not tenderness Calf: no Tenderness  Neurovascular exam Muscle strength: 5/5 Sensation to light touch: intact Distal pulses: 2+  [id number] Xrays of the Right Knee were taken demonstrating mild tricompartmental OA, severe PF OA 2024 Xrays of the right knee were taken showing mild tricompartmental OA, severe PF OA. No interval changes

## 2024-03-25 ENCOUNTER — APPOINTMENT (OUTPATIENT)
Dept: ORTHOPEDIC SURGERY | Facility: CLINIC | Age: 54
End: 2024-03-25
Payer: COMMERCIAL

## 2024-03-25 PROCEDURE — 99214 OFFICE O/P EST MOD 30 MIN: CPT

## 2024-03-25 RX ORDER — METHYLPREDNISOLONE 4 MG/1
4 TABLET ORAL
Qty: 1 | Refills: 1 | Status: ACTIVE | COMMUNITY
Start: 2024-03-25 | End: 1900-01-01

## 2024-03-25 NOTE — IMAGING
[de-identified] : Constitutional: well developed and well nourished, able to communicate Cardiovascular: Peripheral vascular exam is grossly normal Neurologic: Alert and oriented, no acute distress. Skin: normal skin with no ulcers, rashes, or lesions Pulmonary: No respiratory distress, breathing comfortably on room air Lymphatics: No obvious lymphadenopathy or lymphedema in areas examined  bilateral KNEE EXAM Alignment: Varus  Effusion: None Atrophy: None                                                  Stable to Varus/valgus stress Posterior Drawer Test: negative Anterior Drawer Test: Negative Knee Extension/Flexion: 0 / 120  Medial/lateral compartments Medial joint line: POS Tenderness Lateral joint line: No Tenderness Tashi test: negative  Patellofemoral joint Medial patellar facet: no tenderness Patellar grind: Negative  Tendons: Pes Anserine: No tenderness Gerdy's Tubercle/ IT Band: No tenderness Quadriceps Tendon: No Tenderness patellar tendon: no Tenderness Tibial tubercle: not tenderness Calf: no Tenderness  Neurovascular exam Muscle strength: 5/5 Sensation to light touch: intact Distal pulses: 2+  [id number] Xrays of the Right Knee were taken demonstrating mild tricompartmental OA, severe PF OA 2024 Xrays of the right knee were taken showing mild tricompartmental OA, severe PF OA. No interval changes MRI Knee at R: 1.  Medial meniscus tear. 2.  Tricompartmental knee joint osteoarthrosis, moderate to severe in the patellofemoral compartment. 3.  Small to moderate-sized joint effusion with synovitis and tiny intra-articular body.

## 2024-03-25 NOTE — ASSESSMENT
[FreeTextEntry1] : 53 y/o F R knee OA R Knee CSI Follow up in 1-2 weeks for CSI left knee  7/20/23: L Knee CSI Follow up PRN.  10/24/2023 likely rupture bakers cyst. CSI of right knee today and PRN fu  1/26/24: MRI R knee, no relief with conservative management Meloxicam prescribed-Cr 0.80 MRI of the right knee to r/o subchondral fx potential gel injections in the future as this has worked previously   2/20/24: MRI R knee, no relief with conservative management Meloxicam renewed Follow up once MRI completed  03/25/2024 MRI with severe arthritis. primarily in the lateral and PF joint discussed gels vs CSI, bilateral gel injection submission MDP provided today PRN fu

## 2024-03-25 NOTE — HISTORY OF PRESENT ILLNESS
[de-identified] : 06/23/2023 Ms. STEFF CADENA is a 52 year female that comes in today with a chief complaint of Abdirashid knee pt states she has history of knee pain   7/20/23: Here for L knee pain. Good result with R knee CSI.  10/24/2023 Ms. STEFF CADENA is a 52 year female that comes in today for follow up Abdirashid knee pain. Right is worse, pain is going down leg.  1/26/24: Here for follow up. Reports no relief with CSI. No relief with voltaren.   2/20/24: Here for follow up. Both knees with severe pain. Meloxicam with mild temporary relief.   03/25/2024: STEFF is here today for right knee MRI results. reports no changes in symptoms.

## 2024-03-26 RX ORDER — HYALURONATE SODIUM 20 MG/2 ML
20 SYRINGE (ML) INTRAARTICULAR
Qty: 6 | Refills: 0 | Status: ACTIVE | COMMUNITY
Start: 2024-03-26 | End: 1900-01-01

## 2024-04-16 NOTE — H&P PST ADULT - NSCAFFEAMTFREQ_GEN_ALL_CORE_SD
[email or web address]
1-2 cups/cans per day

## 2024-06-06 ENCOUNTER — APPOINTMENT (OUTPATIENT)
Dept: ORTHOPEDIC SURGERY | Facility: CLINIC | Age: 54
End: 2024-06-06
Payer: COMMERCIAL

## 2024-06-06 VITALS — BODY MASS INDEX: 36.8 KG/M2 | HEIGHT: 62 IN | WEIGHT: 200 LBS

## 2024-06-06 PROCEDURE — 20610 DRAIN/INJ JOINT/BURSA W/O US: CPT | Mod: 50

## 2024-06-06 PROCEDURE — 99212 OFFICE O/P EST SF 10 MIN: CPT | Mod: 25

## 2024-06-06 NOTE — IMAGING
[de-identified] : Constitutional: well developed and well nourished, able to communicate Cardiovascular: Peripheral vascular exam is grossly normal Neurologic: Alert and oriented, no acute distress. Skin: normal skin with no ulcers, rashes, or lesions Pulmonary: No respiratory distress, breathing comfortably on room air Lymphatics: No obvious lymphadenopathy or lymphedema in areas examined  bilateral KNEE EXAM Alignment: Varus  Effusion: None Atrophy: None                                                  Stable to Varus/valgus stress Posterior Drawer Test: negative Anterior Drawer Test: Negative Knee Extension/Flexion: 0 / 120  Medial/lateral compartments Medial joint line: POS Tenderness Lateral joint line: No Tenderness Tashi test: negative  Patellofemoral joint Medial patellar facet: no tenderness Patellar grind: Negative  Tendons: Pes Anserine: No tenderness Gerdy's Tubercle/ IT Band: No tenderness Quadriceps Tendon: No Tenderness patellar tendon: no Tenderness Tibial tubercle: not tenderness Calf: no Tenderness  Neurovascular exam Muscle strength: 5/5 Sensation to light touch: intact Distal pulses: 2+  [id number] Xrays of the Right Knee were taken demonstrating mild tricompartmental OA, severe PF OA 2024 Xrays of the right knee were taken showing mild tricompartmental OA, severe PF OA. No interval changes MRI Knee at R: 1.  Medial meniscus tear. 2.  Tricompartmental knee joint osteoarthrosis, moderate to severe in the patellofemoral compartment. 3.  Small to moderate-sized joint effusion with synovitis and tiny intra-articular body.

## 2024-06-06 NOTE — PROCEDURE
[FreeTextEntry3] : The risks, benefits and alternatives to the injection were discussed with the patient. The decision was made to proceed with the injection to reduce inflammation within the area. Verbal consent was obtained for the procedure. The bilateral knees were cleaned with alcohol and ethyl chloride was sprayed topically. Euflexxa 1 of 3 was injected. The patient tolerated the procedure well and was instructed to ice the affected joint as needed later today. Activities can be performed as tolerated.

## 2024-06-06 NOTE — HISTORY OF PRESENT ILLNESS
[Gradual] : gradual [6] : 6 [Dull/Aching] : dull/aching [Throbbing] : throbbing [Constant] : constant [Rest] : rest [Meds] : meds [Standing] : standing [Walking] : walking [Stairs] : stairs [de-identified] : 06/23/2023 Ms. STEFF CADENA is a 52 year female that comes in today with a chief complaint of Abdirashid knee pt states she has history of knee pain   7/20/23: Here for L knee pain. Good result with R knee CSI.  10/24/2023 Ms. STEFF CADENA is a 52 year female that comes in today for follow up Abdirashid knee pain. Right is worse, pain is going down leg.  1/26/24: Here for follow up. Reports no relief with CSI. No relief with voltaren.   2/20/24: Here for follow up. Both knees with severe pain. Meloxicam with mild temporary relief.   03/25/2024: STEFF is here today for right knee MRI results. reports no changes in symptoms.   06/06/24:  Here to start Euflexxa injections in both knees. Euflexxa 1 of 3  [] : no [FreeTextEntry1] : B/L knees

## 2024-06-06 NOTE — ASSESSMENT
[FreeTextEntry1] : 53 y/o F R knee OA R Knee CSI Follow up in 1-2 weeks for CSI left knee  7/20/23: L Knee CSI Follow up PRN.  10/24/2023 likely rupture bakers cyst. CSI of right knee today and PRN fu  1/26/24: MRI R knee, no relief with conservative management Meloxicam prescribed-Cr 0.80 MRI of the right knee to r/o subchondral fx potential gel injections in the future as this has worked previously   2/20/24: MRI R knee, no relief with conservative management Meloxicam renewed Follow up once MRI completed  03/25/2024 MRI with severe arthritis. primarily in the lateral and PF joint discussed gels vs CSI, bilateral gel injection submission MDP provided today PRN fu   06/06/2024: Euflexxa 1 of 3 Follow up in one week for inj #2

## 2024-06-13 ENCOUNTER — APPOINTMENT (OUTPATIENT)
Dept: ORTHOPEDIC SURGERY | Facility: CLINIC | Age: 54
End: 2024-06-13
Payer: COMMERCIAL

## 2024-06-13 PROCEDURE — 20610 DRAIN/INJ JOINT/BURSA W/O US: CPT | Mod: 50

## 2024-06-13 PROCEDURE — 99212 OFFICE O/P EST SF 10 MIN: CPT | Mod: 25

## 2024-06-13 NOTE — HISTORY OF PRESENT ILLNESS
[Gradual] : gradual [6] : 6 [Dull/Aching] : dull/aching [Throbbing] : throbbing [Constant] : constant [Rest] : rest [Meds] : meds [Standing] : standing [Walking] : walking [Stairs] : stairs [de-identified] : 06/23/2023 Ms. STEFF CADENA is a 52 year female that comes in today with a chief complaint of Abdirashid knee pt states she has history of knee pain   7/20/23: Here for L knee pain. Good result with R knee CSI.  10/24/2023 Ms. STEFF CADENA is a 52 year female that comes in today for follow up Abdirashid knee pain. Right is worse, pain is going down leg.  1/26/24: Here for follow up. Reports no relief with CSI. No relief with voltaren.   2/20/24: Here for follow up. Both knees with severe pain. Meloxicam with mild temporary relief.   03/25/2024: STEFF is here today for right knee MRI results. reports no changes in symptoms.   06/06/24:  Here to start Euflexxa injections in both knees. Euflexxa 1 of 3  06/13/2024 STEFF 53 year F is here today for euflexxa inj #2 in both knees.  [] : no [FreeTextEntry1] : B/L knees

## 2024-06-13 NOTE — PROCEDURE
[FreeTextEntry3] : The risks, benefits and alternatives to the injection were discussed with the patient. The decision was made to proceed with the injection to reduce inflammation within the area. Verbal consent was obtained for the procedure. The bilateral knees were cleaned with alcohol and ethyl chloride was sprayed topically. Euflexxa 2 of 3 was injected. The patient tolerated the procedure well and was instructed to ice the affected joint as needed later today. Activities can be performed as tolerated.

## 2024-06-13 NOTE — ASSESSMENT
[FreeTextEntry1] : 51 y/o F R knee OA R Knee CSI Follow up in 1-2 weeks for CSI left knee  7/20/23: L Knee CSI Follow up PRN.  10/24/2023 likely rupture bakers cyst. CSI of right knee today and PRN fu  1/26/24: MRI R knee, no relief with conservative management Meloxicam prescribed-Cr 0.80 MRI of the right knee to r/o subchondral fx potential gel injections in the future as this has worked previously   2/20/24: MRI R knee, no relief with conservative management Meloxicam renewed Follow up once MRI completed  03/25/2024 MRI with severe arthritis. primarily in the lateral and PF joint discussed gels vs CSI, bilateral gel injection submission MDP provided today PRN fu   06/06/2024: Euflexxa 1 of 3 Follow up in one week for inj #2  06/13/2024: Euflexxa 2 of 3 Follow up in one week for inj #3

## 2024-06-13 NOTE — IMAGING
[de-identified] : Constitutional: well developed and well nourished, able to communicate Cardiovascular: Peripheral vascular exam is grossly normal Neurologic: Alert and oriented, no acute distress. Skin: normal skin with no ulcers, rashes, or lesions Pulmonary: No respiratory distress, breathing comfortably on room air Lymphatics: No obvious lymphadenopathy or lymphedema in areas examined  bilateral KNEE EXAM Alignment: Varus  Effusion: None Atrophy: None                                                  Stable to Varus/valgus stress Posterior Drawer Test: negative Anterior Drawer Test: Negative Knee Extension/Flexion: 0 / 120  Medial/lateral compartments Medial joint line: POS Tenderness Lateral joint line: No Tenderness Tashi test: negative  Patellofemoral joint Medial patellar facet: no tenderness Patellar grind: Negative  Tendons: Pes Anserine: No tenderness Gerdy's Tubercle/ IT Band: No tenderness Quadriceps Tendon: No Tenderness patellar tendon: no Tenderness Tibial tubercle: not tenderness Calf: no Tenderness  Neurovascular exam Muscle strength: 5/5 Sensation to light touch: intact Distal pulses: 2+  [id number] Xrays of the Right Knee were taken demonstrating mild tricompartmental OA, severe PF OA 2024 Xrays of the right knee were taken showing mild tricompartmental OA, severe PF OA. No interval changes MRI Knee at R: 1.  Medial meniscus tear. 2.  Tricompartmental knee joint osteoarthrosis, moderate to severe in the patellofemoral compartment. 3.  Small to moderate-sized joint effusion with synovitis and tiny intra-articular body.

## 2024-06-20 ENCOUNTER — APPOINTMENT (OUTPATIENT)
Dept: ORTHOPEDIC SURGERY | Facility: CLINIC | Age: 54
End: 2024-06-20
Payer: COMMERCIAL

## 2024-06-20 DIAGNOSIS — M17.11 UNILATERAL PRIMARY OSTEOARTHRITIS, RIGHT KNEE: ICD-10-CM

## 2024-06-20 DIAGNOSIS — M17.12 UNILATERAL PRIMARY OSTEOARTHRITIS, LEFT KNEE: ICD-10-CM

## 2024-06-20 PROCEDURE — 99212 OFFICE O/P EST SF 10 MIN: CPT | Mod: 25

## 2024-06-20 PROCEDURE — 20610 DRAIN/INJ JOINT/BURSA W/O US: CPT | Mod: 50

## 2024-06-20 NOTE — ASSESSMENT
[FreeTextEntry1] : 53 y/o F R knee OA R Knee CSI Follow up in 1-2 weeks for CSI left knee  7/20/23: L Knee CSI Follow up PRN.  10/24/2023 likely rupture bakers cyst. CSI of right knee today and PRN fu  1/26/24: MRI R knee, no relief with conservative management Meloxicam prescribed-Cr 0.80 MRI of the right knee to r/o subchondral fx potential gel injections in the future as this has worked previously   2/20/24: MRI R knee, no relief with conservative management Meloxicam renewed Follow up once MRI completed  03/25/2024 MRI with severe arthritis. primarily in the lateral and PF joint discussed gels vs CSI, bilateral gel injection submission MDP provided today PRN fu   06/06/2024: Euflexxa 1 of 3 Follow up in one week for inj #2  6/13/24: Euflexxa 2 of 3 Follow up in one week for inj #3  06/20/2024: Euflexxa 3 of 3 Follow up PRN

## 2024-06-20 NOTE — PROCEDURE
[FreeTextEntry3] : The risks, benefits and alternatives to the injection were discussed with the patient. The decision was made to proceed with the injection to reduce inflammation within the area. Verbal consent was obtained for the procedure. The bilateral knees were cleaned with alcohol and ethyl chloride was sprayed topically. Euflexxa 3 of 3 was injected. The patient tolerated the procedure well and was instructed to ice the affected joint as needed later today. Activities can be performed as tolerated.

## 2024-06-20 NOTE — HISTORY OF PRESENT ILLNESS
[de-identified] : 06/23/2023 Ms. STEFF CADENA is a 52 year female that comes in today with a chief complaint of Abdirashid knee pt states she has history of knee pain   7/20/23: Here for L knee pain. Good result with R knee CSI.  10/24/2023 Ms. STEFF CADENA is a 52 year female that comes in today for follow up Abdirashid knee pain. Right is worse, pain is going down leg.  1/26/24: Here for follow up. Reports no relief with CSI. No relief with voltaren.   2/20/24: Here for follow up. Both knees with severe pain. Meloxicam with mild temporary relief.   03/25/2024: STEFF is here today for right knee MRI results. reports no changes in symptoms.   06/06/24:  Here to start Euflexxa injections in both knees. Euflexxa 1 of 3  06/20/2024: Euflexxa 3 of 3  [Gradual] : gradual [6] : 6 [Dull/Aching] : dull/aching [Throbbing] : throbbing [Constant] : constant [Rest] : rest [Meds] : meds [Standing] : standing [Walking] : walking [Stairs] : stairs [] : yes [FreeTextEntry1] : B/L knees

## 2024-06-20 NOTE — IMAGING
[de-identified] : Constitutional: well developed and well nourished, able to communicate Cardiovascular: Peripheral vascular exam is grossly normal Neurologic: Alert and oriented, no acute distress. Skin: normal skin with no ulcers, rashes, or lesions Pulmonary: No respiratory distress, breathing comfortably on room air Lymphatics: No obvious lymphadenopathy or lymphedema in areas examined  bilateral KNEE EXAM Alignment: Varus  Effusion: None Atrophy: None                                                  Stable to Varus/valgus stress Posterior Drawer Test: negative Anterior Drawer Test: Negative Knee Extension/Flexion: 0 / 120  Medial/lateral compartments Medial joint line: POS Tenderness Lateral joint line: No Tenderness Tashi test: negative  Patellofemoral joint Medial patellar facet: no tenderness Patellar grind: Negative  Tendons: Pes Anserine: No tenderness Gerdy's Tubercle/ IT Band: No tenderness Quadriceps Tendon: No Tenderness patellar tendon: no Tenderness Tibial tubercle: not tenderness Calf: no Tenderness  Neurovascular exam Muscle strength: 5/5 Sensation to light touch: intact Distal pulses: 2+  [id number] Xrays of the Right Knee were taken demonstrating mild tricompartmental OA, severe PF OA 2024 Xrays of the right knee were taken showing mild tricompartmental OA, severe PF OA. No interval changes MRI Knee at R: 1.  Medial meniscus tear. 2.  Tricompartmental knee joint osteoarthrosis, moderate to severe in the patellofemoral compartment. 3.  Small to moderate-sized joint effusion with synovitis and tiny intra-articular body.

## 2024-12-18 ENCOUNTER — NON-APPOINTMENT (OUTPATIENT)
Age: 54
End: 2024-12-18

## 2025-09-04 ENCOUNTER — NON-APPOINTMENT (OUTPATIENT)
Age: 55
End: 2025-09-04